# Patient Record
Sex: MALE | Race: WHITE | Employment: FULL TIME | URBAN - METROPOLITAN AREA
[De-identification: names, ages, dates, MRNs, and addresses within clinical notes are randomized per-mention and may not be internally consistent; named-entity substitution may affect disease eponyms.]

---

## 2018-09-16 ENCOUNTER — HOSPITAL ENCOUNTER (EMERGENCY)
Facility: HOSPITAL | Age: 38
Discharge: HOME | End: 2018-09-16
Attending: EMERGENCY MEDICINE
Payer: COMMERCIAL

## 2018-09-16 VITALS
WEIGHT: 225 LBS | HEIGHT: 71 IN | HEART RATE: 97 BPM | SYSTOLIC BLOOD PRESSURE: 128 MMHG | RESPIRATION RATE: 18 BRPM | BODY MASS INDEX: 31.5 KG/M2 | DIASTOLIC BLOOD PRESSURE: 80 MMHG | OXYGEN SATURATION: 98 % | TEMPERATURE: 99 F

## 2018-09-16 DIAGNOSIS — S33.5XXA LUMBAR SPRAIN, INITIAL ENCOUNTER: Primary | ICD-10-CM

## 2018-09-16 DIAGNOSIS — M62.838 SPASM OF MUSCLE: ICD-10-CM

## 2018-09-16 PROCEDURE — 99283 EMERGENCY DEPT VISIT LOW MDM: CPT

## 2018-09-16 PROCEDURE — 96372 THER/PROPH/DIAG INJ SC/IM: CPT

## 2018-09-16 RX ORDER — KETOROLAC TROMETHAMINE 30 MG/ML
60 INJECTION, SOLUTION INTRAMUSCULAR; INTRAVENOUS ONCE
Status: COMPLETED | OUTPATIENT
Start: 2018-09-16 | End: 2018-09-16

## 2018-09-16 RX ORDER — CYCLOBENZAPRINE HCL 10 MG
10 TABLET ORAL 3 TIMES DAILY PRN
Qty: 20 TABLET | Refills: 0 | Status: SHIPPED | OUTPATIENT
Start: 2018-09-16 | End: 2018-09-23

## 2018-09-16 RX ORDER — HYDROCODONE BITARTRATE AND ACETAMINOPHEN 5; 325 MG/1; MG/1
1-2 TABLET ORAL EVERY 4 HOURS PRN
Qty: 8 TABLET | Refills: 0 | Status: SHIPPED | OUTPATIENT
Start: 2018-09-16 | End: 2020-02-24 | Stop reason: ALTCHOICE

## 2018-09-17 NOTE — ED PROVIDER NOTES
Patient Seen in: [facility] Emergency Department    History   Patient presents with:  Back Pain (musculoskeletal)    Stated Complaint: back pain after suddenly stopping while playing soccer, no fall    HPI    Patient is a 71-year-old male who presen extremities  Extremities:/Cyanosis/edema    ED Course   Labs Reviewed - No data to display       Muscular skeletal back pain likely pulled muscle spasm. Discussed with patient, cannot rule out disc disease or other. Was given 60 of IM Toradol.   Suggested

## 2020-07-24 DIAGNOSIS — Z11.59 SPECIAL SCREENING EXAMINATION FOR UNSPECIFIED VIRAL DISEASE: Primary | ICD-10-CM

## 2020-08-04 DIAGNOSIS — Z11.59 SPECIAL SCREENING EXAMINATION FOR UNSPECIFIED VIRAL DISEASE: Primary | ICD-10-CM

## 2020-08-19 DIAGNOSIS — Z11.59 SPECIAL SCREENING EXAMINATION FOR UNSPECIFIED VIRAL DISEASE: Primary | ICD-10-CM

## 2020-08-24 ENCOUNTER — LAB SERVICES (OUTPATIENT)
Dept: LAB | Age: 40
End: 2020-08-24

## 2020-08-24 DIAGNOSIS — Z11.59 SPECIAL SCREENING EXAMINATION FOR UNSPECIFIED VIRAL DISEASE: ICD-10-CM

## 2020-08-24 PROCEDURE — 87635 SARS-COV-2 COVID-19 AMP PRB: CPT | Performed by: INTERNAL MEDICINE

## 2020-08-25 LAB
SARS-COV-2 RNA RESP QL NAA+PROBE: NOT DETECTED
SERVICE CMNT-IMP: NORMAL
SPECIMEN SOURCE: NORMAL

## 2021-12-22 ENCOUNTER — LAB SERVICES (OUTPATIENT)
Dept: LAB | Age: 41
End: 2021-12-22

## 2021-12-22 ENCOUNTER — LAB REQUISITION (OUTPATIENT)
Dept: LAB | Age: 41
End: 2021-12-22

## 2021-12-22 DIAGNOSIS — D64.9 ANEMIA, UNSPECIFIED: ICD-10-CM

## 2021-12-22 LAB
DEPRECATED RDW RBC: 40.5 FL (ref 39–50)
ERYTHROCYTE [DISTWIDTH] IN BLOOD: 13.9 % (ref 11–15)
FERRITIN SERPL-MCNC: 3 NG/ML (ref 26–388)
HCT VFR BLD CALC: 28.6 % (ref 39–51)
HGB BLD-MCNC: 8.5 G/DL (ref 13–17)
IRON SATN MFR SERPL: 4 % (ref 15–45)
IRON SERPL-MCNC: 18 MCG/DL (ref 65–175)
MCH RBC QN AUTO: 23.8 PG (ref 26–34)
MCHC RBC AUTO-ENTMCNC: 29.7 G/DL (ref 32–36.5)
MCV RBC AUTO: 80.1 FL (ref 78–100)
NRBC BLD MANUAL-RTO: 0 /100 WBC
PLATELET # BLD AUTO: 311 K/MCL (ref 140–450)
RBC # BLD: 3.57 MIL/MCL (ref 4.5–5.9)
TIBC SERPL-MCNC: 427 MCG/DL (ref 250–450)
WBC # BLD: 6.9 K/MCL (ref 4.2–11)

## 2021-12-22 PROCEDURE — 83540 ASSAY OF IRON: CPT | Performed by: CLINICAL MEDICAL LABORATORY

## 2021-12-22 PROCEDURE — 82728 ASSAY OF FERRITIN: CPT | Performed by: CLINICAL MEDICAL LABORATORY

## 2021-12-22 PROCEDURE — 83550 IRON BINDING TEST: CPT | Performed by: CLINICAL MEDICAL LABORATORY

## 2021-12-22 PROCEDURE — 85027 COMPLETE CBC AUTOMATED: CPT | Performed by: CLINICAL MEDICAL LABORATORY

## 2022-01-24 DIAGNOSIS — Z11.59 SCREENING EXAMINATION FOR OTHER ARTHROPOD-BORNE VIRAL DISEASES: Primary | ICD-10-CM

## 2022-01-28 ENCOUNTER — LAB SERVICES (OUTPATIENT)
Dept: LAB | Age: 42
End: 2022-01-28

## 2022-01-28 PROCEDURE — U0003 INFECTIOUS AGENT DETECTION BY NUCLEIC ACID (DNA OR RNA); SEVERE ACUTE RESPIRATORY SYNDROME CORONAVIRUS 2 (SARS-COV-2) (CORONAVIRUS DISEASE [COVID-19]), AMPLIFIED PROBE TECHNIQUE, MAKING USE OF HIGH THROUGHPUT TECHNOLOGIES AS DESCRIBED BY CMS-2020-01-R: HCPCS | Performed by: PSYCHIATRY & NEUROLOGY

## 2022-01-28 PROCEDURE — U0005 INFEC AGEN DETEC AMPLI PROBE: HCPCS | Performed by: PSYCHIATRY & NEUROLOGY

## 2022-01-29 LAB
SARS-COV-2 RNA RESP QL NAA+PROBE: NOT DETECTED
SERVICE CMNT-IMP: NORMAL
SERVICE CMNT-IMP: NORMAL

## 2022-10-05 ENCOUNTER — TELEPHONE (OUTPATIENT)
Dept: SURGERY | Facility: CLINIC | Age: 42
End: 2022-10-05

## 2022-10-05 NOTE — TELEPHONE ENCOUNTER
Pt's wife calling stating pt had MRI 9/20/23 at [address] [facility]. She states Dr. Toni Hernandez suggest pt had a stroke. Scheduled pt for first available 2/1/23. Asked Pts wife to bring in imaging. Please advise if pt should be scheduled sooner than first available.

## 2022-10-11 ENCOUNTER — TELEPHONE (OUTPATIENT)
Dept: SURGERY | Facility: CLINIC | Age: 42
End: 2022-10-11

## 2022-10-11 NOTE — TELEPHONE ENCOUNTER
Pt brought scrotal US from [address] [place] [address] in today . DOS 9/16/22.    - multiple clustered extratesticular cystic structures in left hemiscrotum near left epididymis measuring up to 2.7 x 1.7 cm.  - right testicular microlithiasis  - trace b/l hydroceles    Will discuss at upcoming visit. In patient folder.

## 2022-10-11 NOTE — TELEPHONE ENCOUNTER
PT brought in disc and imaging results; imaging uploaded to edw pacs and disc put in  drawer; imaging results endorse to provider for review

## 2022-10-24 ENCOUNTER — OFFICE VISIT (OUTPATIENT)
Dept: NEUROLOGY | Facility: CLINIC | Age: 42
End: 2022-10-24
Payer: COMMERCIAL

## 2022-10-24 VITALS
DIASTOLIC BLOOD PRESSURE: 72 MMHG | SYSTOLIC BLOOD PRESSURE: 126 MMHG | HEART RATE: 74 BPM | BODY MASS INDEX: 29 KG/M2 | WEIGHT: 207 LBS | RESPIRATION RATE: 16 BRPM

## 2022-10-24 DIAGNOSIS — M89.9 FRONTAL SKULL LESION: ICD-10-CM

## 2022-10-24 DIAGNOSIS — Z86.73 OLD CEREBELLAR INFARCT WITHOUT LATE EFFECT: ICD-10-CM

## 2022-10-24 PROCEDURE — 99244 OFF/OP CNSLTJ NEW/EST MOD 40: CPT | Performed by: OTHER

## 2022-10-24 PROCEDURE — 3078F DIAST BP <80 MM HG: CPT | Performed by: OTHER

## 2022-10-24 PROCEDURE — 3074F SYST BP LT 130 MM HG: CPT | Performed by: OTHER

## 2022-10-24 NOTE — PROGRESS NOTES
Patient states left sided headaches. Patient states tingling sensation. Denies vision changes. Denies changes in speech and memory. Patient states some changes in balance.

## 2022-11-03 ENCOUNTER — HOSPITAL ENCOUNTER (OUTPATIENT)
Dept: CV DIAGNOSTICS | Facility: HOSPITAL | Age: 42
Discharge: HOME | End: 2022-11-03
Attending: Other
Payer: COMMERCIAL

## 2022-11-03 ENCOUNTER — TELEPHONE (OUTPATIENT)
Dept: SURGERY | Facility: CLINIC | Age: 42
End: 2022-11-03

## 2022-11-03 ENCOUNTER — HOSPITAL ENCOUNTER (OUTPATIENT)
Dept: CT IMAGING | Facility: HOSPITAL | Age: 42
Discharge: HOME | End: 2022-11-03
Attending: Other
Payer: COMMERCIAL

## 2022-11-03 ENCOUNTER — LAB ENCOUNTER (OUTPATIENT)
Dept: LAB | Age: 42
End: 2022-11-03
Attending: Other
Payer: COMMERCIAL

## 2022-11-03 DIAGNOSIS — M89.9 FRONTAL SKULL LESION: ICD-10-CM

## 2022-11-03 DIAGNOSIS — Z86.73 OLD CEREBELLAR INFARCT WITHOUT LATE EFFECT: ICD-10-CM

## 2022-11-03 LAB
CHOLEST SERPL-MCNC: 140 MG/DL (ref ?–200)
EST. AVERAGE GLUCOSE BLD GHB EST-MCNC: 123 MG/DL (ref 68–126)
FASTING PATIENT LIPID ANSWER: YES
HBA1C MFR BLD: 5.9 % (ref ?–5.7)
HDLC SERPL-MCNC: 55 MG/DL (ref 40–59)
LDLC SERPL CALC-MCNC: 72 MG/DL (ref ?–100)
NONHDLC SERPL-MCNC: 85 MG/DL (ref ?–130)
TRIGL SERPL-MCNC: 66 MG/DL (ref 30–149)
VLDLC SERPL CALC-MCNC: 10 MG/DL (ref 0–30)

## 2022-11-03 PROCEDURE — 80061 LIPID PANEL: CPT

## 2022-11-03 PROCEDURE — 93306 TTE W/DOPPLER COMPLETE: CPT | Performed by: OTHER

## 2022-11-03 PROCEDURE — 70450 CT HEAD/BRAIN W/O DYE: CPT | Performed by: OTHER

## 2022-11-03 PROCEDURE — 83036 HEMOGLOBIN GLYCOSYLATED A1C: CPT

## 2022-11-03 PROCEDURE — 36415 COLL VENOUS BLD VENIPUNCTURE: CPT

## 2022-11-03 NOTE — TELEPHONE ENCOUNTER
Pt came in to drop of imaging from 2016; turns out imaging was done at [facility]; imaging is in chart; I made sure to double check the imaging description in disc was the same as the description in chart; pt would like Dr to review imaging

## 2022-11-07 NOTE — TELEPHONE ENCOUNTER
Patient's partner Kathie Herron notified (ok per HIPAA consent) that Dr Naomie Carrero will not be in clinic until tomorrow and she will be notified of Dr Juana pritchett after she reviews both scans.

## 2022-11-18 ENCOUNTER — HOSPITAL ENCOUNTER (OUTPATIENT)
Dept: CT IMAGING | Facility: HOSPITAL | Age: 42
Discharge: HOME | End: 2022-11-18
Attending: Other
Payer: COMMERCIAL

## 2022-11-18 DIAGNOSIS — C80.1: ICD-10-CM

## 2022-11-18 DIAGNOSIS — C79.51: ICD-10-CM

## 2022-11-18 LAB
CREAT BLD-MCNC: 1.1 MG/DL
GFR SERPLBLD BASED ON 1.73 SQ M-ARVRAT: 85 ML/MIN/1.73M2 (ref 60–?)

## 2022-11-18 PROCEDURE — 71260 CT THORAX DX C+: CPT | Performed by: OTHER

## 2022-11-18 PROCEDURE — 82565 ASSAY OF CREATININE: CPT

## 2022-11-18 PROCEDURE — 74177 CT ABD & PELVIS W/CONTRAST: CPT | Performed by: OTHER

## 2022-11-18 RX ORDER — IOHEXOL 350 MG/ML
100 INJECTION, SOLUTION INTRAVENOUS
Status: COMPLETED | OUTPATIENT
Start: 2022-11-18 | End: 2022-11-18

## 2022-11-18 RX ADMIN — IOHEXOL 100 ML: 350 INJECTION, SOLUTION INTRAVENOUS at 19:04:00

## 2022-11-30 ENCOUNTER — OFFICE VISIT (OUTPATIENT)
Dept: SURGERY | Facility: CLINIC | Age: 42
End: 2022-11-30
Payer: COMMERCIAL

## 2022-11-30 DIAGNOSIS — N50.89 SCROTAL MASS: Primary | ICD-10-CM

## 2022-11-30 DIAGNOSIS — N50.3 EPIDIDYMAL CYST: ICD-10-CM

## 2022-11-30 DIAGNOSIS — M54.42 CHRONIC LEFT-SIDED LOW BACK PAIN WITH LEFT-SIDED SCIATICA: ICD-10-CM

## 2022-11-30 DIAGNOSIS — R82.90 URINE FINDING: ICD-10-CM

## 2022-11-30 DIAGNOSIS — R10.32 LEFT INGUINAL PAIN: ICD-10-CM

## 2022-11-30 DIAGNOSIS — N52.9 ERECTILE DYSFUNCTION, UNSPECIFIED ERECTILE DYSFUNCTION TYPE: ICD-10-CM

## 2022-11-30 DIAGNOSIS — G89.29 CHRONIC LEFT-SIDED LOW BACK PAIN WITH LEFT-SIDED SCIATICA: ICD-10-CM

## 2022-11-30 LAB
APPEARANCE: CLEAR
BILIRUBIN: NEGATIVE
GLUCOSE (URINE DIPSTICK): NEGATIVE MG/DL
LEUKOCYTES: NEGATIVE
MULTISTIX LOT#: NORMAL NUMERIC
NITRITE, URINE: NEGATIVE
OCCULT BLOOD: NEGATIVE
PH, URINE: 5.5 (ref 4.5–8)
PROTEIN (URINE DIPSTICK): NEGATIVE MG/DL
SPECIFIC GRAVITY: >=1.03 (ref 1–1.03)
URINE-COLOR: YELLOW
UROBILINOGEN,SEMI-QN: 0.2 MG/DL (ref 0–1.9)

## 2022-11-30 PROCEDURE — 99244 OFF/OP CNSLTJ NEW/EST MOD 40: CPT | Performed by: UROLOGY

## 2022-11-30 PROCEDURE — 81003 URINALYSIS AUTO W/O SCOPE: CPT | Performed by: UROLOGY

## 2022-12-24 ENCOUNTER — HOSPITAL ENCOUNTER (EMERGENCY)
Facility: HOSPITAL | Age: 42
Discharge: HOME | End: 2022-12-24
Attending: EMERGENCY MEDICINE
Payer: COMMERCIAL

## 2022-12-24 ENCOUNTER — APPOINTMENT (OUTPATIENT)
Dept: GENERAL RADIOLOGY | Facility: HOSPITAL | Age: 42
End: 2022-12-24
Attending: EMERGENCY MEDICINE
Payer: COMMERCIAL

## 2022-12-24 ENCOUNTER — APPOINTMENT (OUTPATIENT)
Dept: CT IMAGING | Facility: HOSPITAL | Age: 42
End: 2022-12-24
Attending: EMERGENCY MEDICINE
Payer: COMMERCIAL

## 2022-12-24 VITALS
HEIGHT: 70 IN | DIASTOLIC BLOOD PRESSURE: 76 MMHG | TEMPERATURE: 99 F | BODY MASS INDEX: 30.06 KG/M2 | RESPIRATION RATE: 17 BRPM | OXYGEN SATURATION: 98 % | SYSTOLIC BLOOD PRESSURE: 124 MMHG | HEART RATE: 63 BPM | WEIGHT: 210 LBS

## 2022-12-24 DIAGNOSIS — D64.9 SYMPTOMATIC ANEMIA: Primary | ICD-10-CM

## 2022-12-24 DIAGNOSIS — K64.9 BLEEDING HEMORRHOID: ICD-10-CM

## 2022-12-24 DIAGNOSIS — R53.1 WEAKNESS GENERALIZED: ICD-10-CM

## 2022-12-24 LAB
ALBUMIN SERPL-MCNC: 3.5 G/DL (ref 3.4–5)
ALBUMIN/GLOB SERPL: 1.1 {RATIO} (ref 1–2)
ALP LIVER SERPL-CCNC: 61 U/L
ALT SERPL-CCNC: 18 U/L
ANION GAP SERPL CALC-SCNC: 5 MMOL/L (ref 0–18)
ANTIBODY SCREEN: NEGATIVE
APTT PPP: 23.9 SECONDS (ref 23.3–35.6)
AST SERPL-CCNC: 17 U/L (ref 15–37)
ATRIAL RATE: 62 BPM
BASOPHILS # BLD AUTO: 0.08 X10(3) UL (ref 0–0.2)
BASOPHILS NFR BLD AUTO: 1.8 %
BILIRUB SERPL-MCNC: 0.3 MG/DL (ref 0.1–2)
BUN BLD-MCNC: 13 MG/DL (ref 7–18)
CALCIUM BLD-MCNC: 8.5 MG/DL (ref 8.5–10.1)
CHLORIDE SERPL-SCNC: 109 MMOL/L (ref 98–112)
CO2 SERPL-SCNC: 25 MMOL/L (ref 21–32)
CREAT BLD-MCNC: 1.11 MG/DL
DEPRECATED HBV CORE AB SER IA-ACNC: 1.9 NG/ML
EOSINOPHIL # BLD AUTO: 0.23 X10(3) UL (ref 0–0.7)
EOSINOPHIL NFR BLD AUTO: 5.1 %
ERYTHROCYTE [DISTWIDTH] IN BLOOD BY AUTOMATED COUNT: 14.9 %
FLUAV + FLUBV RNA SPEC NAA+PROBE: NEGATIVE
FLUAV + FLUBV RNA SPEC NAA+PROBE: NEGATIVE
GFR SERPLBLD BASED ON 1.73 SQ M-ARVRAT: 84 ML/MIN/1.73M2 (ref 60–?)
GLOBULIN PLAS-MCNC: 3.2 G/DL (ref 2.8–4.4)
GLUCOSE BLD-MCNC: 156 MG/DL (ref 70–99)
HCT VFR BLD AUTO: 27 %
HGB BLD-MCNC: 7.4 G/DL
IMM GRANULOCYTES # BLD AUTO: 0.02 X10(3) UL (ref 0–1)
IMM GRANULOCYTES NFR BLD: 0.4 %
INR BLD: 1.05 (ref 0.85–1.16)
IRON SATN MFR SERPL: 2 %
IRON SERPL-MCNC: 12 UG/DL
LYMPHOCYTES # BLD AUTO: 1.61 X10(3) UL (ref 1–4)
LYMPHOCYTES NFR BLD AUTO: 35.6 %
MCH RBC QN AUTO: 19.7 PG (ref 26–34)
MCHC RBC AUTO-ENTMCNC: 27.4 G/DL (ref 31–37)
MCV RBC AUTO: 71.8 FL
MONOCYTES # BLD AUTO: 0.41 X10(3) UL (ref 0.1–1)
MONOCYTES NFR BLD AUTO: 9.1 %
NEUTROPHILS # BLD AUTO: 2.17 X10 (3) UL (ref 1.5–7.7)
NEUTROPHILS # BLD AUTO: 2.17 X10(3) UL (ref 1.5–7.7)
NEUTROPHILS NFR BLD AUTO: 48 %
OSMOLALITY SERPL CALC.SUM OF ELEC: 291 MOSM/KG (ref 275–295)
P AXIS: 7 DEGREES
P-R INTERVAL: 158 MS
PLATELET # BLD AUTO: 326 10(3)UL (ref 150–450)
POTASSIUM SERPL-SCNC: 3.8 MMOL/L (ref 3.5–5.1)
PROT SERPL-MCNC: 6.7 G/DL (ref 6.4–8.2)
PROTHROMBIN TIME: 13.7 SECONDS (ref 11.6–14.8)
Q-T INTERVAL: 392 MS
QRS DURATION: 82 MS
QTC CALCULATION (BEZET): 397 MS
R AXIS: 11 DEGREES
RBC # BLD AUTO: 3.76 X10(6)UL
RH BLOOD TYPE: POSITIVE
RSV RNA SPEC NAA+PROBE: NEGATIVE
SARS-COV-2 RNA RESP QL NAA+PROBE: NOT DETECTED
SODIUM SERPL-SCNC: 139 MMOL/L (ref 136–145)
T AXIS: -2 DEGREES
TIBC SERPL-MCNC: 511 UG/DL (ref 240–450)
TRANSFERRIN SERPL-MCNC: 343 MG/DL (ref 200–360)
TROPONIN I HIGH SENSITIVITY: 4 NG/L
TSI SER-ACNC: 2.13 MIU/ML (ref 0.36–3.74)
VENTRICULAR RATE: 62 BPM
WBC # BLD AUTO: 4.5 X10(3) UL (ref 4–11)

## 2022-12-24 PROCEDURE — 82728 ASSAY OF FERRITIN: CPT | Performed by: EMERGENCY MEDICINE

## 2022-12-24 PROCEDURE — 93005 ELECTROCARDIOGRAM TRACING: CPT

## 2022-12-24 PROCEDURE — 71045 X-RAY EXAM CHEST 1 VIEW: CPT | Performed by: EMERGENCY MEDICINE

## 2022-12-24 PROCEDURE — 96361 HYDRATE IV INFUSION ADD-ON: CPT

## 2022-12-24 PROCEDURE — 83550 IRON BINDING TEST: CPT | Performed by: EMERGENCY MEDICINE

## 2022-12-24 PROCEDURE — 70450 CT HEAD/BRAIN W/O DYE: CPT | Performed by: EMERGENCY MEDICINE

## 2022-12-24 PROCEDURE — 86900 BLOOD TYPING SEROLOGIC ABO: CPT | Performed by: EMERGENCY MEDICINE

## 2022-12-24 PROCEDURE — 84443 ASSAY THYROID STIM HORMONE: CPT | Performed by: EMERGENCY MEDICINE

## 2022-12-24 PROCEDURE — 83540 ASSAY OF IRON: CPT | Performed by: EMERGENCY MEDICINE

## 2022-12-24 PROCEDURE — 85025 COMPLETE CBC W/AUTO DIFF WBC: CPT | Performed by: EMERGENCY MEDICINE

## 2022-12-24 PROCEDURE — 93010 ELECTROCARDIOGRAM REPORT: CPT

## 2022-12-24 PROCEDURE — 84484 ASSAY OF TROPONIN QUANT: CPT | Performed by: EMERGENCY MEDICINE

## 2022-12-24 PROCEDURE — 96365 THER/PROPH/DIAG IV INF INIT: CPT

## 2022-12-24 PROCEDURE — 0241U SARS-COV-2/FLU A AND B/RSV BY PCR (GENEXPERT): CPT | Performed by: EMERGENCY MEDICINE

## 2022-12-24 PROCEDURE — 80053 COMPREHEN METABOLIC PANEL: CPT | Performed by: EMERGENCY MEDICINE

## 2022-12-24 PROCEDURE — 85730 THROMBOPLASTIN TIME PARTIAL: CPT | Performed by: EMERGENCY MEDICINE

## 2022-12-24 PROCEDURE — 86850 RBC ANTIBODY SCREEN: CPT | Performed by: EMERGENCY MEDICINE

## 2022-12-24 PROCEDURE — 36430 TRANSFUSION BLD/BLD COMPNT: CPT

## 2022-12-24 PROCEDURE — 99285 EMERGENCY DEPT VISIT HI MDM: CPT

## 2022-12-24 PROCEDURE — 86920 COMPATIBILITY TEST SPIN: CPT

## 2022-12-24 PROCEDURE — 86901 BLOOD TYPING SEROLOGIC RH(D): CPT | Performed by: EMERGENCY MEDICINE

## 2022-12-24 PROCEDURE — 85610 PROTHROMBIN TIME: CPT | Performed by: EMERGENCY MEDICINE

## 2022-12-24 RX ORDER — SODIUM CHLORIDE 9 MG/ML
INJECTION, SOLUTION INTRAVENOUS ONCE
Status: COMPLETED | OUTPATIENT
Start: 2022-12-24 | End: 2022-12-24

## 2022-12-24 NOTE — ED INITIAL ASSESSMENT (HPI)
Patient was walking in store and states he felt faint. \"It felt like my body was shutting down. \"    No fall. No LOC.

## 2022-12-24 NOTE — DISCHARGE INSTRUCTIONS
If you start bleeding from hemorrhoids again, come to the emergency department. Make sure to follow-up with regular primary care so that you can keep a close eye on the hemoglobin. Make sure to follow-up with gastroenterology on a regular basis. Follow-up with hematology for further iron infusion  Even with the unit of blood given today, you will still be significantly anemic. Please take it easy, do not stress or try to do exercise. No driving or dangerous activity until you follow-up.   Take over-the-counter iron tablets  Return for any problems

## 2022-12-26 LAB — BLOOD TYPE BARCODE: 5100

## 2023-01-03 ENCOUNTER — APPOINTMENT (OUTPATIENT)
Dept: HEMATOLOGY/ONCOLOGY | Facility: HOSPITAL | Age: 43
End: 2023-01-03
Attending: INTERNAL MEDICINE
Payer: COMMERCIAL

## 2023-01-06 ENCOUNTER — TELEPHONE (OUTPATIENT)
Dept: NEUROLOGY | Facility: CLINIC | Age: 43
End: 2023-01-06

## 2023-01-17 ENCOUNTER — HOSPITAL ENCOUNTER (EMERGENCY)
Facility: HOSPITAL | Age: 43
Discharge: HOME | End: 2023-01-17
Attending: EMERGENCY MEDICINE
Payer: COMMERCIAL

## 2023-01-17 VITALS
TEMPERATURE: 97 F | RESPIRATION RATE: 19 BRPM | DIASTOLIC BLOOD PRESSURE: 81 MMHG | HEART RATE: 74 BPM | SYSTOLIC BLOOD PRESSURE: 120 MMHG | OXYGEN SATURATION: 97 %

## 2023-01-17 DIAGNOSIS — K62.5 RECTAL BLEEDING: Primary | ICD-10-CM

## 2023-01-17 DIAGNOSIS — D50.9 IRON DEFICIENCY ANEMIA, UNSPECIFIED IRON DEFICIENCY ANEMIA TYPE: ICD-10-CM

## 2023-01-17 LAB
ALBUMIN SERPL-MCNC: 3.5 G/DL (ref 3.4–5)
ALBUMIN/GLOB SERPL: 1.2 {RATIO} (ref 1–2)
ALP LIVER SERPL-CCNC: 76 U/L
ALT SERPL-CCNC: 26 U/L
ANION GAP SERPL CALC-SCNC: 3 MMOL/L (ref 0–18)
ANTIBODY SCREEN: NEGATIVE
AST SERPL-CCNC: 22 U/L (ref 15–37)
BASOPHILS # BLD AUTO: 0.09 X10(3) UL (ref 0–0.2)
BASOPHILS NFR BLD AUTO: 1.2 %
BILIRUB SERPL-MCNC: 0.2 MG/DL (ref 0.1–2)
BUN BLD-MCNC: 16 MG/DL (ref 7–18)
CALCIUM BLD-MCNC: 8.6 MG/DL (ref 8.5–10.1)
CHLORIDE SERPL-SCNC: 108 MMOL/L (ref 98–112)
CO2 SERPL-SCNC: 28 MMOL/L (ref 21–32)
CREAT BLD-MCNC: 1.08 MG/DL
EOSINOPHIL # BLD AUTO: 0.21 X10(3) UL (ref 0–0.7)
EOSINOPHIL NFR BLD AUTO: 2.8 %
ERYTHROCYTE [DISTWIDTH] IN BLOOD BY AUTOMATED COUNT: 23.9 %
GFR SERPLBLD BASED ON 1.73 SQ M-ARVRAT: 87 ML/MIN/1.73M2 (ref 60–?)
GLOBULIN PLAS-MCNC: 2.9 G/DL (ref 2.8–4.4)
GLUCOSE BLD-MCNC: 100 MG/DL (ref 70–99)
HCT VFR BLD AUTO: 31.3 %
HGB BLD-MCNC: 9.3 G/DL
IMM GRANULOCYTES # BLD AUTO: 0.02 X10(3) UL (ref 0–1)
IMM GRANULOCYTES NFR BLD: 0.3 %
LYMPHOCYTES # BLD AUTO: 2.54 X10(3) UL (ref 1–4)
LYMPHOCYTES NFR BLD AUTO: 33.4 %
MCH RBC QN AUTO: 23.2 PG (ref 26–34)
MCHC RBC AUTO-ENTMCNC: 29.7 G/DL (ref 31–37)
MCV RBC AUTO: 78.1 FL
MONOCYTES # BLD AUTO: 0.68 X10(3) UL (ref 0.1–1)
MONOCYTES NFR BLD AUTO: 8.9 %
NEUTROPHILS # BLD AUTO: 4.07 X10 (3) UL (ref 1.5–7.7)
NEUTROPHILS # BLD AUTO: 4.07 X10(3) UL (ref 1.5–7.7)
NEUTROPHILS NFR BLD AUTO: 53.4 %
OSMOLALITY SERPL CALC.SUM OF ELEC: 289 MOSM/KG (ref 275–295)
PLATELET # BLD AUTO: 336 10(3)UL (ref 150–450)
PLATELET MORPHOLOGY: NORMAL
POTASSIUM SERPL-SCNC: 3.6 MMOL/L (ref 3.5–5.1)
PROT SERPL-MCNC: 6.4 G/DL (ref 6.4–8.2)
RBC # BLD AUTO: 4.01 X10(6)UL
RH BLOOD TYPE: POSITIVE
SARS-COV-2 RNA RESP QL NAA+PROBE: NOT DETECTED
SODIUM SERPL-SCNC: 139 MMOL/L (ref 136–145)
TROPONIN I HIGH SENSITIVITY: 4 NG/L
WBC # BLD AUTO: 7.6 X10(3) UL (ref 4–11)

## 2023-01-17 PROCEDURE — 80053 COMPREHEN METABOLIC PANEL: CPT | Performed by: EMERGENCY MEDICINE

## 2023-01-17 PROCEDURE — 84484 ASSAY OF TROPONIN QUANT: CPT | Performed by: EMERGENCY MEDICINE

## 2023-01-17 PROCEDURE — 99284 EMERGENCY DEPT VISIT MOD MDM: CPT

## 2023-01-17 PROCEDURE — 85025 COMPLETE CBC W/AUTO DIFF WBC: CPT | Performed by: EMERGENCY MEDICINE

## 2023-01-17 PROCEDURE — 86901 BLOOD TYPING SEROLOGIC RH(D): CPT | Performed by: EMERGENCY MEDICINE

## 2023-01-17 PROCEDURE — 86850 RBC ANTIBODY SCREEN: CPT | Performed by: EMERGENCY MEDICINE

## 2023-01-17 PROCEDURE — 96360 HYDRATION IV INFUSION INIT: CPT

## 2023-01-17 PROCEDURE — 86900 BLOOD TYPING SEROLOGIC ABO: CPT | Performed by: EMERGENCY MEDICINE

## 2023-01-17 RX ORDER — SODIUM CHLORIDE 9 MG/ML
1000 INJECTION, SOLUTION INTRAVENOUS ONCE
Status: COMPLETED | OUTPATIENT
Start: 2023-01-17 | End: 2023-01-17

## 2023-01-17 NOTE — ED INITIAL ASSESSMENT (HPI)
A&Ox3 ambulatory patient p/w blood in stool    Patient reports he was hospitalized 12/24/22 for GIB and recd blood transfusion    Reports having bright red blood in stool and in toilet bowl every BM for the past week    Also endorses +nausea, +LH, weakness, fatigue, intermittent lower abdominal pain    RR even/NL

## 2023-01-18 NOTE — DISCHARGE INSTRUCTIONS
Continue your iron supplements. Return to the emergency department for any new or worsening symptoms. You will receive a phone call from [address] [place] [address] tomorrow to move your appointment up.

## 2023-01-23 ENCOUNTER — LAB ENCOUNTER (OUTPATIENT)
Dept: LAB | Facility: HOSPITAL | Age: 43
End: 2023-01-23
Attending: INTERNAL MEDICINE
Payer: COMMERCIAL

## 2023-01-23 DIAGNOSIS — Z20.822 ENCOUNTER FOR PREPROCEDURE SCREENING LABORATORY TESTING FOR COVID-19: ICD-10-CM

## 2023-01-23 DIAGNOSIS — Z01.812 ENCOUNTER FOR PREPROCEDURE SCREENING LABORATORY TESTING FOR COVID-19: ICD-10-CM

## 2023-01-24 ENCOUNTER — OFFICE VISIT (OUTPATIENT)
Dept: HEMATOLOGY/ONCOLOGY | Facility: HOSPITAL | Age: 43
End: 2023-01-24
Attending: INTERNAL MEDICINE
Payer: COMMERCIAL

## 2023-01-24 VITALS
SYSTOLIC BLOOD PRESSURE: 115 MMHG | HEART RATE: 71 BPM | WEIGHT: 218.38 LBS | RESPIRATION RATE: 18 BRPM | TEMPERATURE: 98 F | DIASTOLIC BLOOD PRESSURE: 67 MMHG | BODY MASS INDEX: 31 KG/M2 | OXYGEN SATURATION: 100 %

## 2023-01-24 DIAGNOSIS — D50.0 IRON DEFICIENCY ANEMIA DUE TO CHRONIC BLOOD LOSS: Primary | ICD-10-CM

## 2023-01-24 LAB — SARS-COV-2 RNA RESP QL NAA+PROBE: NOT DETECTED

## 2023-01-24 PROCEDURE — 3074F SYST BP LT 130 MM HG: CPT | Performed by: INTERNAL MEDICINE

## 2023-01-24 PROCEDURE — 3078F DIAST BP <80 MM HG: CPT | Performed by: INTERNAL MEDICINE

## 2023-01-24 PROCEDURE — 99245 OFF/OP CONSLTJ NEW/EST HI 55: CPT | Performed by: INTERNAL MEDICINE

## 2023-01-24 NOTE — PROGRESS NOTES
Education Record    Learner:  Patient and Family Member    Disease / Diagnosis: Anemia    Barriers / Limitations:  None   Comments:    Method:  Discussion   Comments:    General Topics:  Plan of care reviewed   Comments:    Outcome:  Shows understanding   Comments:    Patient states his energy is improving. NO more rectal bleeding. A small amount of shortness of breath. Will get labs in 8 weeks and 1 dose of IV iron.

## 2023-01-25 ENCOUNTER — ANESTHESIA (OUTPATIENT)
Dept: ENDOSCOPY | Facility: HOSPITAL | Age: 43
End: 2023-01-25
Payer: COMMERCIAL

## 2023-01-25 ENCOUNTER — ANESTHESIA EVENT (OUTPATIENT)
Dept: ENDOSCOPY | Facility: HOSPITAL | Age: 43
End: 2023-01-25
Payer: COMMERCIAL

## 2023-01-25 ENCOUNTER — HOSPITAL ENCOUNTER (OUTPATIENT)
Facility: HOSPITAL | Age: 43
Setting detail: HOSPITAL OUTPATIENT SURGERY
Discharge: HOME | End: 2023-01-25
Attending: INTERNAL MEDICINE | Admitting: INTERNAL MEDICINE
Payer: COMMERCIAL

## 2023-01-25 VITALS
WEIGHT: 217 LBS | HEART RATE: 44 BPM | RESPIRATION RATE: 16 BRPM | BODY MASS INDEX: 31.07 KG/M2 | HEIGHT: 70 IN | SYSTOLIC BLOOD PRESSURE: 131 MMHG | OXYGEN SATURATION: 100 % | TEMPERATURE: 98 F | DIASTOLIC BLOOD PRESSURE: 95 MMHG

## 2023-01-25 DIAGNOSIS — Z20.822 ENCOUNTER FOR PREOPERATIVE SCREENING LABORATORY TESTING FOR COVID-19 VIRUS: Primary | ICD-10-CM

## 2023-01-25 DIAGNOSIS — K92.1 HEMATOCHEZIA: ICD-10-CM

## 2023-01-25 DIAGNOSIS — Z01.812 ENCOUNTER FOR PREPROCEDURE SCREENING LABORATORY TESTING FOR COVID-19: ICD-10-CM

## 2023-01-25 DIAGNOSIS — Z01.812 ENCOUNTER FOR PREOPERATIVE SCREENING LABORATORY TESTING FOR COVID-19 VIRUS: Primary | ICD-10-CM

## 2023-01-25 DIAGNOSIS — D50.8 OTHER IRON DEFICIENCY ANEMIAS: ICD-10-CM

## 2023-01-25 DIAGNOSIS — Z20.822 ENCOUNTER FOR PREPROCEDURE SCREENING LABORATORY TESTING FOR COVID-19: ICD-10-CM

## 2023-01-25 PROCEDURE — 0DB98ZX EXCISION OF DUODENUM, VIA NATURAL OR ARTIFICIAL OPENING ENDOSCOPIC, DIAGNOSTIC: ICD-10-PCS | Performed by: INTERNAL MEDICINE

## 2023-01-25 PROCEDURE — 88305 TISSUE EXAM BY PATHOLOGIST: CPT | Performed by: INTERNAL MEDICINE

## 2023-01-25 PROCEDURE — 0DB68ZX EXCISION OF STOMACH, VIA NATURAL OR ARTIFICIAL OPENING ENDOSCOPIC, DIAGNOSTIC: ICD-10-PCS | Performed by: INTERNAL MEDICINE

## 2023-01-25 PROCEDURE — 06LY8CC OCCLUSION OF HEMORRHOIDAL PLEXUS WITH EXTRALUMINAL DEVICE, VIA NATURAL OR ARTIFICIAL OPENING ENDOSCOPIC: ICD-10-PCS | Performed by: INTERNAL MEDICINE

## 2023-01-25 RX ORDER — ONDANSETRON 2 MG/ML
4 INJECTION INTRAMUSCULAR; INTRAVENOUS AS NEEDED
Status: DISCONTINUED | OUTPATIENT
Start: 2023-01-25 | End: 2023-01-25

## 2023-01-25 RX ORDER — NALOXONE HYDROCHLORIDE 0.4 MG/ML
80 INJECTION, SOLUTION INTRAMUSCULAR; INTRAVENOUS; SUBCUTANEOUS AS NEEDED
Status: DISCONTINUED | OUTPATIENT
Start: 2023-01-25 | End: 2023-01-25

## 2023-01-25 RX ORDER — LIDOCAINE HYDROCHLORIDE 10 MG/ML
INJECTION, SOLUTION EPIDURAL; INFILTRATION; INTRACAUDAL; PERINEURAL AS NEEDED
Status: DISCONTINUED | OUTPATIENT
Start: 2023-01-25 | End: 2023-01-25 | Stop reason: SURG

## 2023-01-25 RX ORDER — ONDANSETRON 2 MG/ML
INJECTION INTRAMUSCULAR; INTRAVENOUS
Status: COMPLETED
Start: 2023-01-25 | End: 2023-01-25

## 2023-01-25 RX ORDER — SODIUM CHLORIDE, SODIUM LACTATE, POTASSIUM CHLORIDE, CALCIUM CHLORIDE 600; 310; 30; 20 MG/100ML; MG/100ML; MG/100ML; MG/100ML
INJECTION, SOLUTION INTRAVENOUS CONTINUOUS
Status: DISCONTINUED | OUTPATIENT
Start: 2023-01-25 | End: 2023-01-25

## 2023-01-25 RX ADMIN — LIDOCAINE HYDROCHLORIDE 50 MG: 10 INJECTION, SOLUTION EPIDURAL; INFILTRATION; INTRACAUDAL; PERINEURAL at 09:25:00

## 2023-01-25 RX ADMIN — SODIUM CHLORIDE, SODIUM LACTATE, POTASSIUM CHLORIDE, CALCIUM CHLORIDE: 600; 310; 30; 20 INJECTION, SOLUTION INTRAVENOUS at 09:51:00

## 2023-01-25 NOTE — INTERVAL H&P NOTE
Pre-op Diagnosis: Other iron deficiency anemias [D50.8]  Hematochezia [K92.1]    The above referenced H&P was reviewed by Che Roman DO on 1/25/2023, the patient was examined and no significant changes have occurred in the patient's condition since the H&P was performed. I discussed with the patient and/or legal representative the potential benefits, risks and side effects of this procedure; the likelihood of the patient achieving goals; and potential problems that might occur during recuperation. I discussed reasonable alternatives to the procedure, including risks, benefits and side effects related to the alternatives and risks related to not receiving this procedure. We will proceed with procedure as planned.

## 2023-01-25 NOTE — ANESTHESIA POSTPROCEDURE EVALUATION
[address] [facility] Patient Status:  Hospital Outpatient Surgery   Age/Gender 36year old male MRN [id number]   Location [address] [address] Attending 3 Azeem Castanon DO   Hosp Day # 0 PCP None Pcp       Anesthesia Post-op Note    COLONOSCOPY with Hemorrhoid BANDING X3, ESOPHAGOGASTRODUODENOSCOPY (EGD) WITH BIOPSIES    Procedure Summary     Date: 01/25/23 Room / Location: [facility] ENDOSCOPY 02 / [facility] ENDOSCOPY    Anesthesia Start: 4290 Anesthesia Stop: 0951    Procedures:       COLONOSCOPY with Hemorrhoid BANDING X3, ESOPHAGOGASTRODUODENOSCOPY (EGD) WITH BIOPSIES      . Diagnosis:       Other iron deficiency anemias      Hematochezia      (EGD: HIATAL HERNIA, ESOPHAGITIS, GATRITIS COLON: HEMORRHOIDS,)    Surgeons: Orlando Mcintosh DO Anesthesiologist: George Truong MD    Anesthesia Type: MAC ASA Status: 3          Anesthesia Type: MAC    Vitals Value Taken Time   /90 01/25/23 0952   Temp  01/25/23 0952   Pulse 69 01/25/23 0952   Resp 16 01/25/23 0952   SpO2 100 % 01/25/23 0952       Patient Location: Endoscopy    Anesthesia Type: MAC    Airway Patency: patent    Postop Pain Control: adequate    Mental Status: mildly sedated but able to meaningfully participate in the post-anesthesia evaluation    Nausea/Vomiting: none    Cardiopulmonary/Hydration status: stable euvolemic    Complications: no apparent anesthesia related complications    Postop vital signs: stable    Dental Exam: Unchanged from Preop    Patient to be discharged home when criteria met.

## 2023-01-25 NOTE — DISCHARGE INSTRUCTIONS

## 2023-01-30 ENCOUNTER — APPOINTMENT (OUTPATIENT)
Dept: HEMATOLOGY/ONCOLOGY | Facility: HOSPITAL | Age: 43
End: 2023-01-30
Attending: INTERNAL MEDICINE
Payer: COMMERCIAL

## 2023-02-02 ENCOUNTER — OFFICE VISIT (OUTPATIENT)
Facility: LOCATION | Age: 43
End: 2023-02-02
Payer: COMMERCIAL

## 2023-02-02 ENCOUNTER — APPOINTMENT (OUTPATIENT)
Dept: HEMATOLOGY/ONCOLOGY | Facility: HOSPITAL | Age: 43
End: 2023-02-02
Attending: INTERNAL MEDICINE
Payer: COMMERCIAL

## 2023-02-02 VITALS — HEART RATE: 76 BPM | TEMPERATURE: 99 F

## 2023-02-02 DIAGNOSIS — K64.8 INTERNAL HEMORRHOIDS: Primary | ICD-10-CM

## 2023-02-02 DIAGNOSIS — D50.0 IRON DEFICIENCY ANEMIA DUE TO CHRONIC BLOOD LOSS: ICD-10-CM

## 2023-02-10 ENCOUNTER — APPOINTMENT (OUTPATIENT)
Dept: HEMATOLOGY/ONCOLOGY | Facility: HOSPITAL | Age: 43
End: 2023-02-10
Attending: INTERNAL MEDICINE
Payer: COMMERCIAL

## 2023-02-13 ENCOUNTER — APPOINTMENT (OUTPATIENT)
Dept: HEMATOLOGY/ONCOLOGY | Facility: HOSPITAL | Age: 43
End: 2023-02-13
Attending: INTERNAL MEDICINE
Payer: COMMERCIAL

## 2023-02-17 ENCOUNTER — OFFICE VISIT (OUTPATIENT)
Dept: HEMATOLOGY/ONCOLOGY | Facility: HOSPITAL | Age: 43
End: 2023-02-17
Attending: INTERNAL MEDICINE
Payer: COMMERCIAL

## 2023-02-17 VITALS
OXYGEN SATURATION: 99 % | RESPIRATION RATE: 18 BRPM | SYSTOLIC BLOOD PRESSURE: 146 MMHG | HEART RATE: 74 BPM | TEMPERATURE: 98 F | DIASTOLIC BLOOD PRESSURE: 89 MMHG

## 2023-02-17 DIAGNOSIS — T80.90XA INFUSION REACTION, INITIAL ENCOUNTER: Primary | ICD-10-CM

## 2023-02-17 DIAGNOSIS — D50.0 IRON DEFICIENCY ANEMIA DUE TO CHRONIC BLOOD LOSS: ICD-10-CM

## 2023-02-17 DIAGNOSIS — D50.0 IRON DEFICIENCY ANEMIA DUE TO CHRONIC BLOOD LOSS: Primary | ICD-10-CM

## 2023-02-17 PROCEDURE — 96375 TX/PRO/DX INJ NEW DRUG ADDON: CPT

## 2023-02-17 PROCEDURE — 96374 THER/PROPH/DIAG INJ IV PUSH: CPT

## 2023-02-17 PROCEDURE — 99215 OFFICE O/P EST HI 40 MIN: CPT | Performed by: CLINICAL NURSE SPECIALIST

## 2023-02-17 RX ORDER — METHYLPREDNISOLONE SODIUM SUCCINATE 125 MG/2ML
125 INJECTION, POWDER, LYOPHILIZED, FOR SOLUTION INTRAMUSCULAR; INTRAVENOUS ONCE
Status: COMPLETED | OUTPATIENT
Start: 2023-02-17 | End: 2023-02-17

## 2023-02-17 RX ADMIN — METHYLPREDNISOLONE SODIUM SUCCINATE 125 MG: 125 INJECTION, POWDER, LYOPHILIZED, FOR SOLUTION INTRAMUSCULAR; INTRAVENOUS at 15:41:00

## 2023-02-17 NOTE — PROGRESS NOTES
Education Record    Learner:  Patient and Spouse    Disease / Diagnosis: here for monoferric    Barriers / Limitations:  None   Comments:    Method:  Brief focused and Discussion   Comments:    General Topics:  Medication, Side effects and symptom management and Plan of care reviewed   Comments:    Outcome:  Shows understanding   Comments:    About 5 minutes after Monoferric started, pt c/o \"not feeling good\", felt like he was \"going to pass out\", facial /head flushing, SOB and facial itching. Monoferric stopped and 0.9NS wide open started. VSS. )2 2LNC applied. ALVIN Pringle notified and at chair side. Solumedrol 125mg given. Pt stated he felt better. Per Mark Banda, observe for 15-20 min and if VSS may discharged home. Pt states he had Venofer in the hospital in December and tolerated it well. Per Mark Banda, pt to come back for venofer on Monday. Teresa to discuss with Dr Estil Sacks. Reviewed next appointment. Pt states he feels good and VSS. Discharged home in stable condition, no new complaints.

## 2023-02-20 ENCOUNTER — OFFICE VISIT (OUTPATIENT)
Dept: HEMATOLOGY/ONCOLOGY | Facility: HOSPITAL | Age: 43
End: 2023-02-20
Attending: INTERNAL MEDICINE
Payer: COMMERCIAL

## 2023-02-20 VITALS
SYSTOLIC BLOOD PRESSURE: 115 MMHG | OXYGEN SATURATION: 100 % | RESPIRATION RATE: 18 BRPM | HEART RATE: 75 BPM | TEMPERATURE: 98 F | DIASTOLIC BLOOD PRESSURE: 73 MMHG

## 2023-02-20 PROCEDURE — 99211 OFF/OP EST MAY X REQ PHY/QHP: CPT

## 2023-02-20 NOTE — PROGRESS NOTES
Pt arrived for venofer infusion. Therapy plan changed from monoferric to venofer due to reaction. Insurance auth not yet obtained for venofer infusions. Pt aware. No treatment today.

## 2023-02-21 ENCOUNTER — OFFICE VISIT (OUTPATIENT)
Facility: LOCATION | Age: 43
End: 2023-02-21
Payer: COMMERCIAL

## 2023-02-21 VITALS — HEART RATE: 75 BPM | TEMPERATURE: 99 F

## 2023-02-21 DIAGNOSIS — K62.5 RECTAL BLEED: ICD-10-CM

## 2023-02-21 DIAGNOSIS — K64.8 INTERNAL HEMORRHOIDS: Primary | ICD-10-CM

## 2023-02-21 PROCEDURE — 99215 OFFICE O/P EST HI 40 MIN: CPT | Performed by: SURGERY

## 2023-02-21 RX ORDER — PRAMOXINE HYDROCHLORIDE HYDROCORTISONE ACETATE 100; 100 MG/10G; MG/10G
1 AEROSOL, FOAM TOPICAL 3 TIMES DAILY PRN
Qty: 1 EACH | Refills: 0 | Status: SHIPPED | OUTPATIENT
Start: 2023-02-21

## 2023-02-28 ENCOUNTER — TELEPHONE (OUTPATIENT)
Dept: HEMATOLOGY/ONCOLOGY | Facility: HOSPITAL | Age: 43
End: 2023-02-28

## 2023-02-28 NOTE — TELEPHONE ENCOUNTER
Called and left a message for the patient about the status of his PA for Venofer. If unable to obtain PA by the afternoon, his nurse will call to let him know, otherwise if no calls, then he is ok to proceed.

## 2023-03-01 ENCOUNTER — TELEPHONE (OUTPATIENT)
Dept: HEMATOLOGY/ONCOLOGY | Facility: HOSPITAL | Age: 43
End: 2023-03-01

## 2023-03-01 ENCOUNTER — APPOINTMENT (OUTPATIENT)
Dept: HEMATOLOGY/ONCOLOGY | Facility: HOSPITAL | Age: 43
End: 2023-03-01
Attending: INTERNAL MEDICINE
Payer: COMMERCIAL

## 2023-03-07 RX ORDER — HYDROCORTISONE 25 MG/G
1 CREAM TOPICAL 2 TIMES DAILY
Qty: 28 G | Refills: 0 | Status: SHIPPED | OUTPATIENT
Start: 2023-03-07

## 2023-03-09 ENCOUNTER — OFFICE VISIT (OUTPATIENT)
Dept: HEMATOLOGY/ONCOLOGY | Facility: HOSPITAL | Age: 43
End: 2023-03-09
Attending: INTERNAL MEDICINE
Payer: COMMERCIAL

## 2023-03-09 VITALS
DIASTOLIC BLOOD PRESSURE: 65 MMHG | TEMPERATURE: 98 F | OXYGEN SATURATION: 96 % | SYSTOLIC BLOOD PRESSURE: 105 MMHG | RESPIRATION RATE: 16 BRPM | HEART RATE: 83 BPM

## 2023-03-09 DIAGNOSIS — D50.0 IRON DEFICIENCY ANEMIA DUE TO CHRONIC BLOOD LOSS: Primary | ICD-10-CM

## 2023-03-09 PROCEDURE — 96374 THER/PROPH/DIAG INJ IV PUSH: CPT

## 2023-03-09 NOTE — PROGRESS NOTES
Education Record    Learner:  Patient and Spouse    Disease / Alvarez Natalia deficiency    Barriers / Limitations:  None   Comments:    Method:  Discussion   Comments:    General Topics:  Medication and Plan of care reviewed   Comments:    Outcome:  Shows understanding   Comments:observed for 30 minutes post infusion and vital signs recorded. appts scheduled for remaining venofer doses.

## 2023-03-13 ENCOUNTER — OFFICE VISIT (OUTPATIENT)
Dept: HEMATOLOGY/ONCOLOGY | Facility: HOSPITAL | Age: 43
End: 2023-03-13
Attending: INTERNAL MEDICINE
Payer: COMMERCIAL

## 2023-03-13 VITALS
HEART RATE: 54 BPM | TEMPERATURE: 98 F | DIASTOLIC BLOOD PRESSURE: 76 MMHG | OXYGEN SATURATION: 99 % | WEIGHT: 215 LBS | BODY MASS INDEX: 31 KG/M2 | SYSTOLIC BLOOD PRESSURE: 110 MMHG | RESPIRATION RATE: 16 BRPM

## 2023-03-13 DIAGNOSIS — D50.0 IRON DEFICIENCY ANEMIA DUE TO CHRONIC BLOOD LOSS: Primary | ICD-10-CM

## 2023-03-13 PROCEDURE — 96374 THER/PROPH/DIAG INJ IV PUSH: CPT

## 2023-03-13 NOTE — PROGRESS NOTES
Education Record    Learner:  Patient and Spouse    Disease / Diagnosis: anemia    Barriers / Limitations:  None   Comments:    Method:  Discussion and Printed material   Comments:    General Topics:  Plan of care reviewed   Comments:    Outcome:  Shows understanding   Comments:    Pt here for second venofer infusion. Observed for 30 mins post infusion. Tolerated well. Pt discharged in stable condition.

## 2023-03-14 ENCOUNTER — TELEPHONE (OUTPATIENT)
Facility: LOCATION | Age: 43
End: 2023-03-14

## 2023-03-14 DIAGNOSIS — M89.9 BONE LESION: Primary | ICD-10-CM

## 2023-03-14 NOTE — TELEPHONE ENCOUNTER
Pt was presccribed proctofoam and proctosol.   Wife Ryann Castillo called stating these are not covered by their insurance and is requesting a replacement be called in.  Mylene Coppola [phone]

## 2023-03-15 ENCOUNTER — APPOINTMENT (OUTPATIENT)
Dept: HEMATOLOGY/ONCOLOGY | Facility: HOSPITAL | Age: 43
End: 2023-03-15
Attending: INTERNAL MEDICINE
Payer: COMMERCIAL

## 2023-03-17 ENCOUNTER — OFFICE VISIT (OUTPATIENT)
Dept: HEMATOLOGY/ONCOLOGY | Facility: HOSPITAL | Age: 43
End: 2023-03-17
Attending: INTERNAL MEDICINE
Payer: COMMERCIAL

## 2023-03-17 VITALS
BODY MASS INDEX: 31 KG/M2 | DIASTOLIC BLOOD PRESSURE: 65 MMHG | WEIGHT: 214.63 LBS | HEART RATE: 55 BPM | RESPIRATION RATE: 18 BRPM | SYSTOLIC BLOOD PRESSURE: 125 MMHG | OXYGEN SATURATION: 99 % | TEMPERATURE: 97 F

## 2023-03-17 DIAGNOSIS — D50.0 IRON DEFICIENCY ANEMIA DUE TO CHRONIC BLOOD LOSS: Primary | ICD-10-CM

## 2023-03-17 PROCEDURE — 96374 THER/PROPH/DIAG INJ IV PUSH: CPT

## 2023-03-17 NOTE — PROGRESS NOTES
Education Record    Learner:  Patient    Disease / Auther Setter deficiency anemia due to chronic blood loss     Barriers / Limitations:  None   Comments:    Method:  Brief focused   Comments:    General Topics:  Infection, Medication, Pain, Precautions, Procedure, Side effects and symptom management, Plan of care reviewed and Fall risk and prevention   Comments:    Outcome:  Shows understanding   Comments: Tolerated iron well. Observed for 30 minutes  . VSS

## 2023-03-20 ENCOUNTER — OFFICE VISIT (OUTPATIENT)
Dept: HEMATOLOGY/ONCOLOGY | Facility: HOSPITAL | Age: 43
End: 2023-03-20
Attending: INTERNAL MEDICINE
Payer: COMMERCIAL

## 2023-03-20 VITALS
HEART RATE: 64 BPM | TEMPERATURE: 98 F | SYSTOLIC BLOOD PRESSURE: 109 MMHG | RESPIRATION RATE: 16 BRPM | OXYGEN SATURATION: 100 % | DIASTOLIC BLOOD PRESSURE: 64 MMHG

## 2023-03-20 DIAGNOSIS — D50.0 IRON DEFICIENCY ANEMIA DUE TO CHRONIC BLOOD LOSS: Primary | ICD-10-CM

## 2023-03-20 PROCEDURE — 96374 THER/PROPH/DIAG INJ IV PUSH: CPT

## 2023-03-20 RX ORDER — HYDROCORTISONE ACETATE 25 MG/1
25 SUPPOSITORY RECTAL DAILY
Qty: 14 SUPPOSITORY | Refills: 0 | Status: SHIPPED | OUTPATIENT
Start: 2023-03-20

## 2023-03-20 NOTE — PROGRESS NOTES
Education Record    Learner:  Patient    Disease / Diagnosis: SHAYLA    Barriers / Limitations:  None   Comments:    Method:  Brief focused and Reinforcement   Comments:    General Topics:  Diet, Medication, Side effects and symptom management and Plan of care reviewed   Comments:    Outcome:  Shows understanding   Comments: Venofer infused without any complications. Observed for half hour after infusion. Vital signs taken at the end of the 30-minute observation. Patient denied any complaints/issues. Discharged in good condition. Advised to call for any questions/concerns/issues.

## 2023-03-21 ENCOUNTER — APPOINTMENT (OUTPATIENT)
Dept: HEMATOLOGY/ONCOLOGY | Facility: HOSPITAL | Age: 43
End: 2023-03-21
Attending: INTERNAL MEDICINE
Payer: COMMERCIAL

## 2023-03-24 ENCOUNTER — OFFICE VISIT (OUTPATIENT)
Dept: HEMATOLOGY/ONCOLOGY | Facility: HOSPITAL | Age: 43
End: 2023-03-24
Attending: INTERNAL MEDICINE
Payer: COMMERCIAL

## 2023-03-24 VITALS — HEART RATE: 60 BPM | SYSTOLIC BLOOD PRESSURE: 113 MMHG | DIASTOLIC BLOOD PRESSURE: 73 MMHG

## 2023-03-24 DIAGNOSIS — D50.0 IRON DEFICIENCY ANEMIA DUE TO CHRONIC BLOOD LOSS: Primary | ICD-10-CM

## 2023-03-24 PROCEDURE — 96374 THER/PROPH/DIAG INJ IV PUSH: CPT

## 2023-03-24 NOTE — PROGRESS NOTES
Education Record    Learner:  Patient and Spouse    Disease / Diagnosis:iron deficiency    Barriers / Limitations:  None   Comments:    Method:  Discussion   Comments:    General Topics:  Medication and Plan of care reviewed   Comments:    Outcome:  Shows understanding   Comments:Appt scheduled for 8 weeks for labs. Patient verbalizes understanding.

## 2023-04-21 ENCOUNTER — HOSPITAL ENCOUNTER (OUTPATIENT)
Dept: NUCLEAR MEDICINE | Facility: HOSPITAL | Age: 43
Discharge: HOME | End: 2023-04-21
Attending: INTERNAL MEDICINE
Payer: COMMERCIAL

## 2023-04-21 DIAGNOSIS — M89.9 BONE LESION: ICD-10-CM

## 2023-04-21 LAB — GLUCOSE BLD-MCNC: 82 MG/DL (ref 70–99)

## 2023-04-21 PROCEDURE — 82962 GLUCOSE BLOOD TEST: CPT

## 2023-04-21 PROCEDURE — 78815 PET IMAGE W/CT SKULL-THIGH: CPT | Performed by: INTERNAL MEDICINE

## 2023-04-24 DIAGNOSIS — R93.2 ABNORMAL PET SCAN, LIVER: Primary | ICD-10-CM

## 2023-05-24 ENCOUNTER — PATIENT MESSAGE (OUTPATIENT)
Facility: LOCATION | Age: 43
End: 2023-05-24

## 2023-05-24 ENCOUNTER — PATIENT MESSAGE (OUTPATIENT)
Dept: HEMATOLOGY/ONCOLOGY | Facility: HOSPITAL | Age: 43
End: 2023-05-24

## 2023-05-25 NOTE — TELEPHONE ENCOUNTER
From: Gay Kennedy  To: Jossie Zavala MD  Sent: 5/24/2023 2:27 PM CDT  Subject: diet plan for anemia     Spoke to Luba Shields she is the dietitian for [facility] and was unable to tell us what type of foods should be eating due to my condition. Luba Shields said to contact you and as you what foods should be eating as I am still anemic.

## 2023-06-05 ENCOUNTER — NURSE ONLY (OUTPATIENT)
Dept: HEMATOLOGY/ONCOLOGY | Facility: HOSPITAL | Age: 43
End: 2023-06-05
Attending: INTERNAL MEDICINE
Payer: COMMERCIAL

## 2023-06-05 DIAGNOSIS — D50.0 IRON DEFICIENCY ANEMIA DUE TO CHRONIC BLOOD LOSS: ICD-10-CM

## 2023-06-05 LAB
BASOPHILS # BLD AUTO: 0.06 X10(3) UL (ref 0–0.2)
BASOPHILS NFR BLD AUTO: 1.1 %
DEPRECATED HBV CORE AB SER IA-ACNC: 2.3 NG/ML
EOSINOPHIL # BLD AUTO: 0.22 X10(3) UL (ref 0–0.7)
EOSINOPHIL NFR BLD AUTO: 4 %
ERYTHROCYTE [DISTWIDTH] IN BLOOD BY AUTOMATED COUNT: 16 %
FOLATE SERPL-MCNC: 19.4 NG/ML (ref 8.7–?)
HCT VFR BLD AUTO: 27.2 %
HGB BLD-MCNC: 7.8 G/DL
IMM GRANULOCYTES # BLD AUTO: 0.01 X10(3) UL (ref 0–1)
IMM GRANULOCYTES NFR BLD: 0.2 %
IRON SATN MFR SERPL: 57 %
IRON SERPL-MCNC: 267 UG/DL
LYMPHOCYTES # BLD AUTO: 2.17 X10(3) UL (ref 1–4)
LYMPHOCYTES NFR BLD AUTO: 39.2 %
MCH RBC QN AUTO: 20.9 PG (ref 26–34)
MCHC RBC AUTO-ENTMCNC: 28.7 G/DL (ref 31–37)
MCV RBC AUTO: 72.9 FL
MONOCYTES # BLD AUTO: 0.41 X10(3) UL (ref 0.1–1)
MONOCYTES NFR BLD AUTO: 7.4 %
NEUTROPHILS # BLD AUTO: 2.67 X10 (3) UL (ref 1.5–7.7)
NEUTROPHILS # BLD AUTO: 2.67 X10(3) UL (ref 1.5–7.7)
NEUTROPHILS NFR BLD AUTO: 48.1 %
PLATELET # BLD AUTO: 396 10(3)UL (ref 150–450)
RBC # BLD AUTO: 3.73 X10(6)UL
TIBC SERPL-MCNC: 468 UG/DL (ref 240–450)
TRANSFERRIN SERPL-MCNC: 314 MG/DL (ref 200–360)
VIT B12 SERPL-MCNC: 251 PG/ML (ref 193–986)
WBC # BLD AUTO: 5.5 X10(3) UL (ref 4–11)

## 2023-06-05 PROCEDURE — 83521 IG LIGHT CHAINS FREE EACH: CPT

## 2023-06-05 PROCEDURE — 85240 CLOT FACTOR VIII AHG 1 STAGE: CPT

## 2023-06-05 PROCEDURE — 85025 COMPLETE CBC W/AUTO DIFF WBC: CPT

## 2023-06-05 PROCEDURE — 84165 PROTEIN E-PHORESIS SERUM: CPT

## 2023-06-05 PROCEDURE — 83550 IRON BINDING TEST: CPT

## 2023-06-05 PROCEDURE — 86334 IMMUNOFIX E-PHORESIS SERUM: CPT

## 2023-06-05 PROCEDURE — 82728 ASSAY OF FERRITIN: CPT

## 2023-06-05 PROCEDURE — 83540 ASSAY OF IRON: CPT

## 2023-06-05 PROCEDURE — 82607 VITAMIN B-12: CPT

## 2023-06-05 PROCEDURE — 36415 COLL VENOUS BLD VENIPUNCTURE: CPT

## 2023-06-05 PROCEDURE — 82746 ASSAY OF FOLIC ACID SERUM: CPT

## 2023-06-05 PROCEDURE — 85245 CLOT FACTOR VIII VW RISTOCTN: CPT

## 2023-06-05 PROCEDURE — 85246 CLOT FACTOR VIII VW ANTIGEN: CPT

## 2023-06-07 LAB
ALBUMIN SERPL ELPH-MCNC: 4.12 G/DL (ref 3.75–5.21)
ALBUMIN/GLOB SERPL: 1.81 {RATIO} (ref 1–2)
ALPHA1 GLOB SERPL ELPH-MCNC: 0.26 G/DL (ref 0.19–0.46)
ALPHA2 GLOB SERPL ELPH-MCNC: 0.51 G/DL (ref 0.48–1.05)
B-GLOBULIN SERPL ELPH-MCNC: 0.66 G/DL (ref 0.68–1.23)
FACTOR VIII ACT: 83 %
GAMMA GLOB SERPL ELPH-MCNC: 0.85 G/DL (ref 0.62–1.7)
KAPPA LC FREE SER-MCNC: 2.19 MG/DL (ref 0.33–1.94)
KAPPA LC FREE/LAMBDA FREE SER NEPH: 1.12 {RATIO} (ref 0.26–1.65)
LAMBDA LC FREE SERPL-MCNC: 1.95 MG/DL (ref 0.57–2.63)
PROT SERPL-MCNC: 6.4 G/DL (ref 6.4–8.2)
VWF ACTIVITY: 45 %
VWF AG: 60 %

## 2023-06-12 ENCOUNTER — OFFICE VISIT (OUTPATIENT)
Dept: HEMATOLOGY/ONCOLOGY | Facility: HOSPITAL | Age: 43
End: 2023-06-12
Attending: INTERNAL MEDICINE
Payer: COMMERCIAL

## 2023-06-12 VITALS
WEIGHT: 202 LBS | SYSTOLIC BLOOD PRESSURE: 102 MMHG | DIASTOLIC BLOOD PRESSURE: 66 MMHG | RESPIRATION RATE: 18 BRPM | OXYGEN SATURATION: 100 % | BODY MASS INDEX: 29 KG/M2 | TEMPERATURE: 98 F | HEART RATE: 63 BPM

## 2023-06-12 VITALS — DIASTOLIC BLOOD PRESSURE: 65 MMHG | SYSTOLIC BLOOD PRESSURE: 105 MMHG | HEART RATE: 70 BPM

## 2023-06-12 DIAGNOSIS — D50.0 IRON DEFICIENCY ANEMIA DUE TO CHRONIC BLOOD LOSS: Primary | ICD-10-CM

## 2023-06-12 PROCEDURE — 96365 THER/PROPH/DIAG IV INF INIT: CPT

## 2023-06-12 PROCEDURE — 99214 OFFICE O/P EST MOD 30 MIN: CPT | Performed by: CLINICAL NURSE SPECIALIST

## 2023-06-12 NOTE — PROGRESS NOTES
Education Record    Learner:  Patient and Spouse    Disease / Diagnosis: Iron Deficiency Anemia due to chronic blood loss    Barriers / Limitations:  None   Comments:    Method:  Brief focused and Reinforcement   Comments:    General Topics:  Medication and Plan of care reviewed   Comments:    Outcome:  Shows understanding   Comments:  Venofer infused without any complications. Observed for half hour after infusion. Vital signs taken at the end of the 30-minute observation. Patient denied any complaints/issues. Discharged in good condition. Advised to call for any questions/concerns/issues. Plan: Venofer 300 mg weekly x 3 doses. Per ALVIN Paul - labs 8 weeks after last dose and follow up with Dr. Rachna Chao after. Patient and spouse aware of plan of care and verbalized understanding. All appointments requested.

## 2023-06-14 ENCOUNTER — ANESTHESIA EVENT (OUTPATIENT)
Dept: ENDOSCOPY | Facility: HOSPITAL | Age: 43
End: 2023-06-14
Payer: COMMERCIAL

## 2023-06-14 ENCOUNTER — HOSPITAL ENCOUNTER (OUTPATIENT)
Dept: MRI IMAGING | Facility: HOSPITAL | Age: 43
Discharge: HOME | End: 2023-06-14
Attending: INTERNAL MEDICINE
Payer: COMMERCIAL

## 2023-06-14 ENCOUNTER — HOSPITAL ENCOUNTER (OUTPATIENT)
Facility: HOSPITAL | Age: 43
Setting detail: HOSPITAL OUTPATIENT SURGERY
Discharge: HOME | End: 2023-06-14
Attending: INTERNAL MEDICINE | Admitting: INTERNAL MEDICINE
Payer: COMMERCIAL

## 2023-06-14 ENCOUNTER — ANESTHESIA (OUTPATIENT)
Dept: ENDOSCOPY | Facility: HOSPITAL | Age: 43
End: 2023-06-14
Payer: COMMERCIAL

## 2023-06-14 VITALS
RESPIRATION RATE: 16 BRPM | BODY MASS INDEX: 28.77 KG/M2 | HEIGHT: 70 IN | DIASTOLIC BLOOD PRESSURE: 96 MMHG | OXYGEN SATURATION: 100 % | WEIGHT: 201 LBS | TEMPERATURE: 97 F | HEART RATE: 68 BPM | SYSTOLIC BLOOD PRESSURE: 132 MMHG

## 2023-06-14 DIAGNOSIS — R93.2 ABNORMAL PET SCAN, LIVER: ICD-10-CM

## 2023-06-14 PROCEDURE — A9581 GADOXETATE DISODIUM INJ: HCPCS | Performed by: INTERNAL MEDICINE

## 2023-06-14 PROCEDURE — 06LY8CC OCCLUSION OF HEMORRHOIDAL PLEXUS WITH EXTRALUMINAL DEVICE, VIA NATURAL OR ARTIFICIAL OPENING ENDOSCOPIC: ICD-10-PCS | Performed by: INTERNAL MEDICINE

## 2023-06-14 PROCEDURE — 74183 MRI ABD W/O CNTR FLWD CNTR: CPT | Performed by: INTERNAL MEDICINE

## 2023-06-14 RX ORDER — SODIUM CHLORIDE, SODIUM LACTATE, POTASSIUM CHLORIDE, CALCIUM CHLORIDE 600; 310; 30; 20 MG/100ML; MG/100ML; MG/100ML; MG/100ML
INJECTION, SOLUTION INTRAVENOUS CONTINUOUS
Status: DISCONTINUED | OUTPATIENT
Start: 2023-06-14 | End: 2023-06-14

## 2023-06-14 RX ORDER — ONDANSETRON 2 MG/ML
INJECTION INTRAMUSCULAR; INTRAVENOUS
Status: COMPLETED
Start: 2023-06-14 | End: 2023-06-14

## 2023-06-14 RX ORDER — ONDANSETRON 2 MG/ML
4 INJECTION INTRAMUSCULAR; INTRAVENOUS ONCE
Status: COMPLETED | OUTPATIENT
Start: 2023-06-14 | End: 2023-06-14

## 2023-06-14 RX ADMIN — SODIUM CHLORIDE, SODIUM LACTATE, POTASSIUM CHLORIDE, CALCIUM CHLORIDE: 600; 310; 30; 20 INJECTION, SOLUTION INTRAVENOUS at 08:36:00

## 2023-06-14 NOTE — ANESTHESIA POSTPROCEDURE EVALUATION
[address] [facility] Patient Status:  Hospital Outpatient Surgery   Age/Gender 39year old male MRN [id number]   Location [address] [facility] 28 Attending Steven Whitney, [address] Day # 0 PCP Deb Schaefer MD       Anesthesia Post-op Note    FLEXIBLE SIGMOIDOSCOPY with hemorrhoid banding X3    Procedure Summary     Date: 06/14/23 Room / Location: 1404 [facility] ENDOSCOPY 02 / [address] [facility] ENDOSCOPY    Anesthesia Start: 5887 Anesthesia Stop: 2197    Procedure: Michael Cantu with hemorrhoid banding X3 Diagnosis:       Hemorrhoids, unspecified hemorrhoid type      (Hemorrhoids)    Surgeons: Steven Whitney DO Anesthesiologist: Dieter Zabala MD    Anesthesia Type: MAC ASA Status: 3          Anesthesia Type: No value filed. Vitals Value Taken Time   /60 06/14/23 0835   Temp n 06/14/23 0838   Pulse 73 06/14/23 0838   Resp 14 06/14/23 0838   SpO2 100 % 06/14/23 0838   Vitals shown include unvalidated device data. Patient Location: Endoscopy    Anesthesia Type: MAC    Airway Patency: patent    Postop Pain Control: adequate    Mental Status: mildly sedated but able to meaningfully participate in the post-anesthesia evaluation    Nausea/Vomiting: none    Cardiopulmonary/Hydration status: stable euvolemic    Complications: no apparent anesthesia related complications    Postop vital signs: stable    Dental Exam: Unchanged from Preop    Patient to be discharged home when criteria met.

## 2023-06-14 NOTE — DISCHARGE INSTRUCTIONS

## 2023-06-19 ENCOUNTER — OFFICE VISIT (OUTPATIENT)
Dept: HEMATOLOGY/ONCOLOGY | Facility: HOSPITAL | Age: 43
End: 2023-06-19
Attending: INTERNAL MEDICINE
Payer: COMMERCIAL

## 2023-06-19 VITALS
SYSTOLIC BLOOD PRESSURE: 110 MMHG | WEIGHT: 201.38 LBS | BODY MASS INDEX: 29 KG/M2 | DIASTOLIC BLOOD PRESSURE: 66 MMHG | OXYGEN SATURATION: 99 % | HEART RATE: 53 BPM | TEMPERATURE: 97 F | RESPIRATION RATE: 16 BRPM

## 2023-06-19 DIAGNOSIS — D50.0 IRON DEFICIENCY ANEMIA DUE TO CHRONIC BLOOD LOSS: Primary | ICD-10-CM

## 2023-06-19 PROCEDURE — 96365 THER/PROPH/DIAG IV INF INIT: CPT

## 2023-06-19 NOTE — PROGRESS NOTES
Education Record    Learner:  Patient    Disease / Diagnosis: SHAYLA    Barriers / Limitations:  None   Comments:    Method:  Brief focused and Reinforcement   Comments:    General Topics:  Plan of care reviewed   Comments:    Outcome:  Shows understanding   Comments: Venofer infused without any complications. Observed for half hour after infusion. Vital signs taken at the end of the 30-minute observation. Patient denied any complaints/issues. Discharged in good condition. Advised to call for any questions/concerns/issues.

## 2023-06-26 ENCOUNTER — OFFICE VISIT (OUTPATIENT)
Dept: HEMATOLOGY/ONCOLOGY | Facility: HOSPITAL | Age: 43
End: 2023-06-26
Attending: INTERNAL MEDICINE
Payer: COMMERCIAL

## 2023-06-26 VITALS
SYSTOLIC BLOOD PRESSURE: 123 MMHG | TEMPERATURE: 98 F | WEIGHT: 196.38 LBS | BODY MASS INDEX: 28 KG/M2 | OXYGEN SATURATION: 100 % | HEART RATE: 49 BPM | DIASTOLIC BLOOD PRESSURE: 75 MMHG | RESPIRATION RATE: 16 BRPM

## 2023-06-26 DIAGNOSIS — D50.0 IRON DEFICIENCY ANEMIA DUE TO CHRONIC BLOOD LOSS: Primary | ICD-10-CM

## 2023-06-26 PROCEDURE — 96365 THER/PROPH/DIAG IV INF INIT: CPT

## 2023-08-22 ENCOUNTER — APPOINTMENT (OUTPATIENT)
Dept: HEMATOLOGY/ONCOLOGY | Facility: HOSPITAL | Age: 43
End: 2023-08-22
Attending: INTERNAL MEDICINE
Payer: COMMERCIAL

## 2023-08-28 ENCOUNTER — NURSE ONLY (OUTPATIENT)
Dept: HEMATOLOGY/ONCOLOGY | Facility: HOSPITAL | Age: 43
End: 2023-08-28
Attending: INTERNAL MEDICINE
Payer: COMMERCIAL

## 2023-08-28 DIAGNOSIS — D50.0 IRON DEFICIENCY ANEMIA DUE TO CHRONIC BLOOD LOSS: ICD-10-CM

## 2023-08-28 LAB
BASOPHILS # BLD AUTO: 0.07 X10(3) UL (ref 0–0.2)
BASOPHILS NFR BLD AUTO: 0.9 %
DEPRECATED HBV CORE AB SER IA-ACNC: 6.1 NG/ML
EOSINOPHIL # BLD AUTO: 0.26 X10(3) UL (ref 0–0.7)
EOSINOPHIL NFR BLD AUTO: 3.4 %
ERYTHROCYTE [DISTWIDTH] IN BLOOD BY AUTOMATED COUNT: 21.9 %
HCT VFR BLD AUTO: 39.3 %
HGB BLD-MCNC: 12.8 G/DL
HGB RETIC QN AUTO: 32.4 PG (ref 28.2–36.6)
IMM GRANULOCYTES # BLD AUTO: 0.02 X10(3) UL (ref 0–1)
IMM GRANULOCYTES NFR BLD: 0.3 %
IMM RETICS NFR: 0.13 RATIO (ref 0.1–0.3)
IRON SATN MFR SERPL: 11 %
IRON SERPL-MCNC: 45 UG/DL
LYMPHOCYTES # BLD AUTO: 2.72 X10(3) UL (ref 1–4)
LYMPHOCYTES NFR BLD AUTO: 35.9 %
MCH RBC QN AUTO: 25.3 PG (ref 26–34)
MCHC RBC AUTO-ENTMCNC: 32.6 G/DL (ref 31–37)
MCV RBC AUTO: 77.8 FL
MONOCYTES # BLD AUTO: 0.68 X10(3) UL (ref 0.1–1)
MONOCYTES NFR BLD AUTO: 9 %
NEUTROPHILS # BLD AUTO: 3.83 X10 (3) UL (ref 1.5–7.7)
NEUTROPHILS # BLD AUTO: 3.83 X10(3) UL (ref 1.5–7.7)
NEUTROPHILS NFR BLD AUTO: 50.5 %
PLATELET # BLD AUTO: 248 10(3)UL (ref 150–450)
RBC # BLD AUTO: 5.05 X10(6)UL
RETICS # AUTO: 49.5 X10(3) UL (ref 22.5–147.5)
RETICS/RBC NFR AUTO: 1 %
TIBC SERPL-MCNC: 402 UG/DL (ref 240–450)
TRANSFERRIN SERPL-MCNC: 270 MG/DL (ref 200–360)
WBC # BLD AUTO: 7.6 X10(3) UL (ref 4–11)

## 2023-08-28 PROCEDURE — 83540 ASSAY OF IRON: CPT

## 2023-08-28 PROCEDURE — 82728 ASSAY OF FERRITIN: CPT

## 2023-08-28 PROCEDURE — 83550 IRON BINDING TEST: CPT

## 2023-08-28 PROCEDURE — 85045 AUTOMATED RETICULOCYTE COUNT: CPT

## 2023-08-28 PROCEDURE — 36415 COLL VENOUS BLD VENIPUNCTURE: CPT

## 2023-08-28 PROCEDURE — 85025 COMPLETE CBC W/AUTO DIFF WBC: CPT

## 2023-09-12 ENCOUNTER — TELEPHONE (OUTPATIENT)
Dept: HEMATOLOGY/ONCOLOGY | Facility: HOSPITAL | Age: 43
End: 2023-09-12

## 2023-09-13 ENCOUNTER — APPOINTMENT (OUTPATIENT)
Dept: HEMATOLOGY/ONCOLOGY | Facility: HOSPITAL | Age: 43
End: 2023-09-13
Attending: INTERNAL MEDICINE
Payer: COMMERCIAL

## 2023-09-26 ENCOUNTER — OFFICE VISIT (OUTPATIENT)
Facility: LOCATION | Age: 43
End: 2023-09-26
Payer: COMMERCIAL

## 2023-09-26 VITALS — WEIGHT: 195 LBS | HEIGHT: 70 IN | TEMPERATURE: 98 F | HEART RATE: 55 BPM | BODY MASS INDEX: 27.92 KG/M2

## 2023-09-26 DIAGNOSIS — K64.4 INTERNAL AND EXTERNAL BLEEDING HEMORRHOIDS: Primary | ICD-10-CM

## 2023-09-26 DIAGNOSIS — D50.0 IRON DEFICIENCY ANEMIA DUE TO CHRONIC BLOOD LOSS: ICD-10-CM

## 2023-09-26 DIAGNOSIS — K64.8 INTERNAL AND EXTERNAL BLEEDING HEMORRHOIDS: Primary | ICD-10-CM

## 2023-09-26 DIAGNOSIS — K64.9 HEMORRHOIDS, UNSPECIFIED HEMORRHOID TYPE: ICD-10-CM

## 2023-09-26 PROCEDURE — 3008F BODY MASS INDEX DOCD: CPT | Performed by: STUDENT IN AN ORGANIZED HEALTH CARE EDUCATION/TRAINING PROGRAM

## 2023-09-26 PROCEDURE — 99204 OFFICE O/P NEW MOD 45 MIN: CPT | Performed by: STUDENT IN AN ORGANIZED HEALTH CARE EDUCATION/TRAINING PROGRAM

## 2023-09-27 ENCOUNTER — TELEPHONE (OUTPATIENT)
Dept: HEMATOLOGY/ONCOLOGY | Facility: HOSPITAL | Age: 43
End: 2023-09-27

## 2023-10-05 ENCOUNTER — OFFICE VISIT (OUTPATIENT)
Dept: HEMATOLOGY/ONCOLOGY | Facility: HOSPITAL | Age: 43
End: 2023-10-05
Attending: INTERNAL MEDICINE
Payer: COMMERCIAL

## 2023-10-05 VITALS
DIASTOLIC BLOOD PRESSURE: 68 MMHG | RESPIRATION RATE: 16 BRPM | BODY MASS INDEX: 29 KG/M2 | OXYGEN SATURATION: 100 % | SYSTOLIC BLOOD PRESSURE: 111 MMHG | HEART RATE: 54 BPM | TEMPERATURE: 98 F | WEIGHT: 201.19 LBS

## 2023-10-05 DIAGNOSIS — D50.0 IRON DEFICIENCY ANEMIA DUE TO CHRONIC BLOOD LOSS: Primary | ICD-10-CM

## 2023-10-05 PROCEDURE — 96365 THER/PROPH/DIAG IV INF INIT: CPT

## 2023-10-05 NOTE — PROGRESS NOTES
Pt here for iron infusion. Arrives Ambulating independently, accompanied by Other self           Modifications in dose or schedule: No     Frequency of blood return and site check throughout administration: Prior to administration   Discharged to Home, Ambulating independently, accompanied by: Other self    Outpatient Oncology Care Plan  Problem list:  anemia  Problems related to:    disease/disease progression  Interventions:  administered iron  Expected outcomes:  optimal lab values  Progress towards outcome:  making progress    Education Record    Learner:  Patient  Barriers / Limitations:  None  Method:  Brief focused  Outcome:  Shows understanding  Comments:observed pt 30 min post infusion. Pt tolerated infusion. No c/o. VSS. See flowsheet    Pt tolerated venofer. VSS. Appt in 8 weeks with MD and possible venofer infusion.

## 2023-10-19 ENCOUNTER — TELEPHONE (OUTPATIENT)
Facility: LOCATION | Age: 43
End: 2023-10-19

## 2023-10-19 NOTE — TELEPHONE ENCOUNTER
Pt spouse called to ask how to fill out TOM. Assisted her with how to fill out TOM. Logged disab forms for processing.  Pt seeking disab for upcoming SX.

## 2023-11-06 NOTE — TELEPHONE ENCOUNTER
Dr. Ron Thomas,     *The ACKNOWLEDGE button has been moved to the top right ribbon*    Please sign off on form if you agree to: Disab for surgery 12/4/23-2-4 wks post op  (place your signature on the first page only)    -From your Inbasket, Highlight the patient and click Chart   -Double click the 43/52/14 Forms Completion telephone encounter  -Scroll down to the Media section   -Click the blue Hyperlink: Disab Dr Ron Thomas 98/1/40  -Click Acknowledge located in the top right ribbon/menu   -Drag the mouse into the blank space of the document and a + sign will appear. Left click to   electronically sign the document. Thank you,    Bela Cuellar.

## 2023-11-08 ENCOUNTER — LAB ENCOUNTER (OUTPATIENT)
Dept: LAB | Facility: HOSPITAL | Age: 43
End: 2023-11-08
Attending: STUDENT IN AN ORGANIZED HEALTH CARE EDUCATION/TRAINING PROGRAM
Payer: COMMERCIAL

## 2023-11-08 DIAGNOSIS — D50.0 IRON DEFICIENCY ANEMIA DUE TO CHRONIC BLOOD LOSS: Primary | ICD-10-CM

## 2023-11-08 DIAGNOSIS — D50.0 IRON DEFICIENCY ANEMIA DUE TO CHRONIC BLOOD LOSS: ICD-10-CM

## 2023-11-08 LAB
BASOPHILS # BLD AUTO: 0.06 X10(3) UL (ref 0–0.2)
BASOPHILS NFR BLD AUTO: 0.8 %
DEPRECATED HBV CORE AB SER IA-ACNC: 4.8 NG/ML
EOSINOPHIL # BLD AUTO: 0.37 X10(3) UL (ref 0–0.7)
EOSINOPHIL NFR BLD AUTO: 5.2 %
ERYTHROCYTE [DISTWIDTH] IN BLOOD BY AUTOMATED COUNT: 15.7 %
HCT VFR BLD AUTO: 35.2 %
HGB BLD-MCNC: 10.1 G/DL
HGB RETIC QN AUTO: 22 PG (ref 28.2–36.6)
IMM GRANULOCYTES # BLD AUTO: 0.03 X10(3) UL (ref 0–1)
IMM GRANULOCYTES NFR BLD: 0.4 %
IMM RETICS NFR: 0.27 RATIO (ref 0.1–0.3)
IRON SATN MFR SERPL: 5 %
IRON SERPL-MCNC: 26 UG/DL
LYMPHOCYTES # BLD AUTO: 2.65 X10(3) UL (ref 1–4)
LYMPHOCYTES NFR BLD AUTO: 37.5 %
MCH RBC QN AUTO: 22.8 PG (ref 26–34)
MCHC RBC AUTO-ENTMCNC: 28.7 G/DL (ref 31–37)
MCV RBC AUTO: 79.5 FL
MONOCYTES # BLD AUTO: 0.53 X10(3) UL (ref 0.1–1)
MONOCYTES NFR BLD AUTO: 7.5 %
NEUTROPHILS # BLD AUTO: 3.43 X10 (3) UL (ref 1.5–7.7)
NEUTROPHILS # BLD AUTO: 3.43 X10(3) UL (ref 1.5–7.7)
NEUTROPHILS NFR BLD AUTO: 48.6 %
PLATELET # BLD AUTO: 287 10(3)UL (ref 150–450)
RBC # BLD AUTO: 4.43 X10(6)UL
RETICS # AUTO: 43.9 X10(3) UL (ref 22.5–147.5)
RETICS/RBC NFR AUTO: 1 %
TIBC SERPL-MCNC: 478 UG/DL (ref 240–450)
TRANSFERRIN SERPL-MCNC: 321 MG/DL (ref 200–360)
WBC # BLD AUTO: 7.1 X10(3) UL (ref 4–11)

## 2023-11-08 PROCEDURE — 83550 IRON BINDING TEST: CPT

## 2023-11-08 PROCEDURE — 83540 ASSAY OF IRON: CPT

## 2023-11-08 PROCEDURE — 85025 COMPLETE CBC W/AUTO DIFF WBC: CPT

## 2023-11-08 PROCEDURE — 82728 ASSAY OF FERRITIN: CPT

## 2023-11-08 PROCEDURE — 85045 AUTOMATED RETICULOCYTE COUNT: CPT

## 2023-11-08 PROCEDURE — 36415 COLL VENOUS BLD VENIPUNCTURE: CPT

## 2023-11-27 ENCOUNTER — TELEPHONE (OUTPATIENT)
Facility: LOCATION | Age: 43
End: 2023-11-27

## 2023-11-27 DIAGNOSIS — K64.9 HEMORRHOIDS, UNSPECIFIED HEMORRHOID TYPE: Primary | ICD-10-CM

## 2023-11-29 NOTE — PROGRESS NOTES
[facility] Hematology and Oncology Clinic Note    Visit Diagnosis:  No diagnosis found. History of Present Illness: 43M with a PMH of CVA and anemia was referred by [facility] for anemia.     -He presented to the ER on 12/24/22 with weakness. He was noted to have a Hb of 7.4. He was given IV Iron and PRBCs. He presented to the ER in 01/2023 for hematochezia. In the ER, his hemoglobin was 9.3 and MCV 78. In 12/2022, his ferritin was 1.9, FE sat 2, TIBC 511. CT CAP from 11/2022, noted to have hepatic hemangiomas, fatty liver, large amount of food debris in stomach. He endorses a history of hemorrhoids. He has a history of bleeding hemorrhoids. No epistaxis or gum bleeding. He feels better after his blood transfusion but still weak and light headed. Occasional night sweats.     -In 2022, he has had multiple EGD/Colonoscopies which did not show a GI source. He met with . [facility] is scheduled for a repeat Colon/EGD with possible hemorrhoidal banding +/- VCE.     -IV Moferric 1000 mg x 1: 02/2023    -IV Venofer 200 mg x 5: 03/2023    -IV Venofer 300 mg x 2: 06/2023    -IV Venofer 300 mg x 1: 10/2023    -IV Venofer 300 mg x 3: planned. Starts  11/30/23    Interval history:  -Met with surgery-discussed hemorrhoidectomy. This is scheduled for 2/19/24  -Feels better after venofer  -Bleeding is starting to slow down   -Labs from 11/2023 c/w with SHAYLA, ferritin 4, Hb 10  -Plan for Venofer today     Review of Systems: [address] was completed and pertinent positives are in the HPI    No current outpatient medications on file prior to visit. No current facility-administered medications on file prior to visit.      Past Medical History:   Diagnosis Date    Abdominal pain 01/13/2023    First day of pain    Acute, but ill-defined, cerebrovascular disease 01/02/2021    We really do not know when    Anemia 01/01/2022    Back pain 01/01/1996    Accident    Black stools 01/01/2014    Was with previous GI dr    Blood disorder anemic- seeing Hematologist for first time 1/24/23    Blood in the stool 01/01/2014    Was with previous GI dr Palmer chau 01/01/1994    Have several    Dizziness 01/01/2022    Been weak dizy lose blood almost daily    Excessive bleeding 01/01/2014    When going poop    Glaucoma 07/15/2016    Surgery    Headache disorder 07/15/2016    Seeing neuro     Hemorrhoids 01/01/2014    First noticed issues    History of blood transfusion     Jaundice 12/24/2022    Went to ER    Lab test positive for detection of COVID-19 virus     11/2021 NO HOSPITALIZATION- CHILLS,FEVER, loss taste ,smell, body aches    Migraines     Mini stroke     Nausea 01/13/2023    Stroke [facility])     TIA- unsure when MRI revealed    Weight loss 12/24/2022     Past Surgical History:   Procedure Laterality Date    COLONOSCOPY  01/31/2022    Last one    COLONOSCOPY N/A 01/25/2023    Procedure: COLONOSCOPY with Hemorrhoid BANDING X3, ESOPHAGOGASTRODUODENOSCOPY (EGD) WITH BIOPSIES;  Surgeon: Vanessa Lopez DO;  Location: [address] [place] [address] ENDOSCOPY    COLONOSCOPY  06/2023    flex sig w hemorrhoid banding     Social History     Socioeconomic History    Marital status:    Tobacco Use    Smoking status: Never    Smokeless tobacco: Never   Vaping Use    Vaping Use: Never used   Substance and Sexual Activity    Alcohol use: Yes     Alcohol/week: 3.0 standard drinks of alcohol     Types: 3 Cans of beer per week     Comment: occasionally    Drug use: Yes     Frequency: 35.0 times per week     Types: Cannabis     Comment: Daily use/ flower      No family history on file.     Physical Exam  Height: --  Weight: --  BSA (Calculated - sq m): --  Pulse: --  BP: --  Temp: --  Do Not Use - Resp Rate: --  SpO2: --       General: NAD, AOX3  HEENT: clear op, mmm, no jvd, no scleral icterus  CV: RR  Extremities: No edema   Lungs: no increased work of breathing  Abd: soft nt nd +BS no hepatosplenomegaly  Neuro: CN: II-XII grossly intact    Results:  Lab Results   Component Value Date    WBC 7.1 11/08/2023    HGB 10.1 (L) 11/08/2023    HCT 35.2 (L) 11/08/2023    MCV 79.5 (L) 11/08/2023    .0 11/08/2023     Lab Results   Component Value Date     01/17/2023    K 3.6 01/17/2023    CO2 28.0 01/17/2023     01/17/2023    BUN 16 01/17/2023    ALB 4.12 06/05/2023       No results found for: \"LDH\"    Radiology: reviewed     Pathology: reviewed     Assessment and Plan:  42M with a PMH of CVA and anemia was referred by [facility] for anemia. Iron Deficiency anemia  -Likely from hemorrhoidal bleeding-plan for hemorrhoidectomy in 02/2024  -S/p extensive GI work up  -Repeat CBC/Fe studies c/w SHAYLA. Plan for IV venofer 300 mg x 3  -Repeat CBC and Fe studies in 8 weeks (around 01/30/24)     Left frontal bone enhancing lesion  -Per radiology review, appears to represent an atypical hemangioma   -CT CAP unremarkable and PET/CT unremarkable   -Following with neurology  -SPEP unremarkable     MDM: high-IV Iron    [facility]

## 2023-11-30 ENCOUNTER — OFFICE VISIT (OUTPATIENT)
Dept: HEMATOLOGY/ONCOLOGY | Facility: HOSPITAL | Age: 43
End: 2023-11-30
Attending: INTERNAL MEDICINE
Payer: COMMERCIAL

## 2023-11-30 VITALS
SYSTOLIC BLOOD PRESSURE: 135 MMHG | TEMPERATURE: 98 F | RESPIRATION RATE: 18 BRPM | BODY MASS INDEX: 29 KG/M2 | DIASTOLIC BLOOD PRESSURE: 81 MMHG | WEIGHT: 202.38 LBS | OXYGEN SATURATION: 100 % | HEART RATE: 73 BPM

## 2023-11-30 VITALS — SYSTOLIC BLOOD PRESSURE: 116 MMHG | HEART RATE: 64 BPM | DIASTOLIC BLOOD PRESSURE: 84 MMHG

## 2023-11-30 DIAGNOSIS — D50.0 IRON DEFICIENCY ANEMIA DUE TO CHRONIC BLOOD LOSS: Primary | ICD-10-CM

## 2023-11-30 PROCEDURE — 99214 OFFICE O/P EST MOD 30 MIN: CPT | Performed by: INTERNAL MEDICINE

## 2023-11-30 PROCEDURE — 96365 THER/PROPH/DIAG IV INF INIT: CPT

## 2023-11-30 NOTE — PROGRESS NOTES
Pt here for iron infusion. Arrives Ambulating independently, accompanied by Other self           Modifications in dose or schedule: No     Frequency of blood return and site check throughout administration: Prior to administration   Discharged to Home, Ambulating independently, accompanied by: Other self    Outpatient Oncology Care Plan  Problem list:  anemia  Problems related to:    disease/disease progression  Interventions:  administered iron  Expected outcomes:  optimal lab values  Progress towards outcome:  making progress    Education Record    Learner:  Patient  Barriers / Limitations:  None  Method:  Brief focused  Outcome:  Shows understanding  Comments:observed pt 30 min post infusion. Pt tolerated infusion. No c/o. VSS. See flowsheet    Here for venofer 300mg, has had before.  Needs three doses total. Per billing, Bela Godinez still in review but most likely should be approved since it has in past. Note placed in next appt note to verify Bela Godinez is complete

## 2023-11-30 NOTE — PROGRESS NOTES
Education Record    Learner:  Patient    Disease / Diagnosis:SHAYLA    Barriers / Limitations:  None   Comments:    Method:  Discussion   Comments:    General Topics:  Plan of care reviewed   Comments:    Outcome:  Shows understanding   Comments:    Patient here for follow-up and IV iron. Seen by MD only.

## 2023-12-08 ENCOUNTER — OFFICE VISIT (OUTPATIENT)
Dept: HEMATOLOGY/ONCOLOGY | Facility: HOSPITAL | Age: 43
End: 2023-12-08
Attending: INTERNAL MEDICINE
Payer: COMMERCIAL

## 2023-12-08 VITALS — SYSTOLIC BLOOD PRESSURE: 134 MMHG | DIASTOLIC BLOOD PRESSURE: 74 MMHG | HEART RATE: 60 BPM

## 2023-12-08 DIAGNOSIS — D50.0 IRON DEFICIENCY ANEMIA DUE TO CHRONIC BLOOD LOSS: Primary | ICD-10-CM

## 2023-12-08 PROCEDURE — 96365 THER/PROPH/DIAG IV INF INIT: CPT

## 2023-12-08 NOTE — PROGRESS NOTES
Pt here for iron infusion. Arrives Ambulating independently, accompanied by Other self           Modifications in dose or schedule: No     Frequency of blood return and site check throughout administration: Prior to administration   Discharged to Home, Ambulating independently, accompanied by: Other self    Outpatient Oncology Care Plan  Problem list:  anemia  Problems related to:    disease/disease progression  Interventions:  administered iron  Expected outcomes:  optimal lab values  Progress towards outcome:  making progress    Education Record    Learner:  Patient  Barriers / Limitations:  None  Method:  Brief focused  Outcome:  Shows understanding  Comments:observed pt 30 min post infusion. Pt tolerated infusion. No c/o. VSS. See flowsheet    Pt tolerated venofer 300 mg today. Left in stable condition. Appt in place. VSS after monitoring period.

## 2023-12-15 ENCOUNTER — OFFICE VISIT (OUTPATIENT)
Dept: HEMATOLOGY/ONCOLOGY | Facility: HOSPITAL | Age: 43
End: 2023-12-15
Attending: INTERNAL MEDICINE
Payer: COMMERCIAL

## 2023-12-15 VITALS
SYSTOLIC BLOOD PRESSURE: 108 MMHG | HEART RATE: 61 BPM | DIASTOLIC BLOOD PRESSURE: 63 MMHG | RESPIRATION RATE: 16 BRPM | TEMPERATURE: 96 F | OXYGEN SATURATION: 98 %

## 2023-12-15 DIAGNOSIS — D50.0 IRON DEFICIENCY ANEMIA DUE TO CHRONIC BLOOD LOSS: Primary | ICD-10-CM

## 2023-12-15 PROCEDURE — 96365 THER/PROPH/DIAG IV INF INIT: CPT

## 2023-12-15 NOTE — PROGRESS NOTES
Pt here for Venofer . Pt denies any issues or concerns. Ordering MD: Dr. Skip Roche Exp: dose ordered were 3 - today is his 3rd dose then will do labs in 8 weeks     Pt tolerated infusion without difficulty or complaint. Reviewed next apt date/time: Yes, reminded patient to have labs done in 8 weeks. PL appointment set up for 2/9/24 at 3:30 PM. Patient aware of this appointment. Education Record  Learner:  Patient and Spouse  Disease / Diagnosis: Iron Deficiency Anemia  Barriers / Limitations:  None  Method:  Brief focused and Reinforcement  General Topics:  Plan of care reviewed  Outcome:  Shows understanding  Venofer infused without any complications. Observed for half hour after infusion. Vital signs taken at the end of the 30-minute observation. Patient denied any complaints/issues. Discharged in good condition. Advised to call for any questions/concerns/issues.

## 2024-01-20 ENCOUNTER — HOSPITAL ENCOUNTER (EMERGENCY)
Facility: HOSPITAL | Age: 44
Discharge: HOME | End: 2024-01-20
Attending: EMERGENCY MEDICINE
Payer: COMMERCIAL

## 2024-01-20 VITALS
HEART RATE: 61 BPM | RESPIRATION RATE: 16 BRPM | TEMPERATURE: 98 F | DIASTOLIC BLOOD PRESSURE: 66 MMHG | SYSTOLIC BLOOD PRESSURE: 122 MMHG | OXYGEN SATURATION: 95 % | HEIGHT: 70 IN | BODY MASS INDEX: 29.35 KG/M2 | WEIGHT: 205 LBS

## 2024-01-20 DIAGNOSIS — L02.91 ABSCESS: Primary | ICD-10-CM

## 2024-01-20 PROCEDURE — 10060 I&D ABSCESS SIMPLE/SINGLE: CPT

## 2024-01-20 PROCEDURE — 99284 EMERGENCY DEPT VISIT MOD MDM: CPT

## 2024-01-20 PROCEDURE — 99283 EMERGENCY DEPT VISIT LOW MDM: CPT

## 2024-01-20 RX ORDER — CLINDAMYCIN HYDROCHLORIDE 150 MG/1
300 CAPSULE ORAL ONCE
Status: COMPLETED | OUTPATIENT
Start: 2024-01-20 | End: 2024-01-20

## 2024-01-20 RX ORDER — CLINDAMYCIN HYDROCHLORIDE 300 MG/1
300 CAPSULE ORAL 4 TIMES DAILY
Qty: 40 CAPSULE | Refills: 0 | Status: SHIPPED | OUTPATIENT
Start: 2024-01-20 | End: 2024-01-30

## 2024-01-20 NOTE — ED INITIAL ASSESSMENT (HPI)
Patient arrived to the ED from home with c/o abscess to left groin since Wednesday. Patient states the abscess popped this morning and is draining pus. Denies fevers, chills, body aches. AAOx4 and rates pain 2/10.

## 2024-01-20 NOTE — DISCHARGE INSTRUCTIONS
Follow-up with a general surgeon in the next 1 week for wound check.  Return for spreading redness, fevers above 100.4 Fahrenheit or any other concerning symptoms.  Take the antibiotic as prescribed.

## 2024-01-20 NOTE — ED PROVIDER NOTES
Patient Seen in: [facility] Emergency Department      History     Chief Complaint   Patient presents with    Abscess     Stated Complaint: abscess to left groin since wednesday, pop today with pus    Subjective:   HPI    43-year-old male presents today for evaluation of a left inguinal abscess.  Patient noticed a red bump in his left lateral groin on Wednesday.  Today, 30 minutes prior to arrival, the area ruptured and patient had purulent bloody discharge.  Patient denies any fevers or vomiting.    Objective:   Past Medical History:   Diagnosis Date    Abdominal pain 01/13/2023    First day of pain    Acute, but ill-defined, cerebrovascular disease 01/02/2021    We really do not know when    Anemia 01/01/2022    Back pain 01/01/1996    Accident    Black stools 01/01/2014    Was with previous GI     Blood disorder     anemic- seeing Hematologist for first time 1/24/23    Blood in the stool 01/01/2014    Was with previous GI dr Yip tattoo 01/01/1994    Have several    Dizziness 01/01/2022    Been weak dizy lose blood almost daily    Excessive bleeding 01/01/2014    When going poop    Glaucoma 07/15/2016    Surgery    Headache disorder 07/15/2016    Seeing neuro     Hemorrhoids 01/01/2014    First noticed issues    History of blood transfusion     Jaundice 12/24/2022    Went to ER    Lab test positive for detection of COVID-19 virus     11/2021 NO HOSPITALIZATION- CHILLS,FEVER, loss taste ,smell, body aches    Migraines     Mini stroke     Nausea 01/13/2023    Stroke (HCC)     TIA- unsure when MRI revealed    Weight loss 12/24/2022              Past Surgical History:   Procedure Laterality Date    COLONOSCOPY  01/31/2022    Last one    COLONOSCOPY N/A 01/25/2023    Procedure: COLONOSCOPY with Hemorrhoid BANDING X3, ESOPHAGOGASTRODUODENOSCOPY (EGD) WITH BIOPSIES;  Surgeon: Chris Hernandez DO;  Location:  ENDOSCOPY    COLONOSCOPY  06/2023    flex sig w hemorrhoid banding                Social  History     Socioeconomic History    Marital status:    Tobacco Use    Smoking status: Never    Smokeless tobacco: Never   Vaping Use    Vaping Use: Never used   Substance and Sexual Activity    Alcohol use: Yes     Alcohol/week: 3.0 standard drinks of alcohol     Types: 3 Cans of beer per week     Comment: occasionally    Drug use: Yes     Frequency: 35.0 times per week     Types: Cannabis     Comment: Daily use/ flower   Other Topics Concern    Caffeine Concern Yes     Comment: 1 cup coffee daily    Exercise Yes     Comment: at least 1x per week, active at work              Review of Systems    Positive for stated complaint: abscess to left groin since wednesday, pop today with pus  Other systems are as noted in HPI.  Constitutional and vital signs reviewed.      All other systems reviewed and negative except as noted above.    Physical Exam     ED Triage Vitals   BP 01/20/24 1151 122/66   Pulse 01/20/24 1151 70   Resp 01/20/24 1151 16   Temp 01/20/24 1156 98.1 °F (36.7 °C)   Temp src 01/20/24 1156 Oral   SpO2 01/20/24 1151 97 %   O2 Device 01/20/24 1215 None (Room air)       Current:/66   Pulse 61   Temp 98.1 °F (36.7 °C) (Oral)   Resp 16   Ht 177.8 cm (5' 10\")   Wt 93 kg   SpO2 95%   BMI 29.41 kg/m²         Physical Exam  Vitals and nursing note reviewed.   Constitutional:       Appearance: Normal appearance.   HENT:      Head: Normocephalic.      Nose: Nose normal.      Mouth/Throat:      Mouth: Mucous membranes are moist.   Eyes:      Extraocular Movements: Extraocular movements intact.   Cardiovascular:      Rate and Rhythm: Normal rate.   Pulmonary:      Effort: Pulmonary effort is normal.   Abdominal:      General: Abdomen is flat.   Musculoskeletal:         General: Normal range of motion.   Skin:     General: Skin is warm.      Comments: Approximately 3 cm area of induration with spontaneously draining bloody purulent drainage over the lateral aspect.  There are some mild overlying  erythema.   Neurological:      General: No focal deficit present.      Mental Status: He is alert.   Psychiatric:         Mood and Affect: Mood normal.               ED Course   Labs Reviewed - No data to display                   MDM      Differential Diagnosis  43-year-old male presents today for evaluation of a left groin redness and swelling that spontaneously ruptured at home.  The area is just anterior and medial to his left iliac crest.  Upon pressing on the area, there is some bloody purulent discharge that returns.  Differential includes abscess and cellulitis.  I discussed risk benefits of incision and drainage in the ED. verbal consent was given.  I extended the spontaneous opening by approximately half a centimeter.  there was a small amount of purulent drainage.  The abscess was then irrigated.  Surrounding the abscess, there is a small halo of what I think is cellulitis.  Patient will benefit from oral antibiotics.  I counseled patient and wife on symptomatic management for home along with ED return precautions.  I encouraged surgical follow-up next week for wound check.  Both feel comfortable with plan, will DC.    Patient mentioned an intermittent abdominal pain in his right abdomen that has been occurring for several months.  According to both he and his wife, he was evaluated for this by his outpatient care team.  He does not have any pain right now.  His abdomen is soft without any tenderness, guarding or rigidity.  I discussed evaluating his abdominal pain here in the ER, including CT and labs.  Both respectfully declined evaluation.  With him being pain-free with the reassuring belly exam along his request, will not pursue any further evaluation today.                                   Medical Decision Making      Disposition and Plan     Clinical Impression:  1. Abscess         Disposition:  Discharge  1/20/2024 12:09 pm    Follow-up:  Felicity Malloy MD  [address]  [address]  [place]  [place] [id number]  [phone]    Call in 1 day(s)            Medications Prescribed:  Discharge Medication List as of 1/20/2024 12:23 PM        START taking these medications    Details   clindamycin 300 MG Oral Cap Take 1 capsule (300 mg total) by mouth 4 (four) times daily for 10 days., Normal, Disp-40 capsule, R-0

## 2024-01-29 NOTE — PROGRESS NOTES
[facility] Note    Visit Diagnosis:  No diagnosis found.    History of Present Illness: 43M with a PMH of CVA and anemia was referred by Mary for anemia.     -He presented to the ER on 12/24/22 with weakness. He was noted to have a Hb of 7.4. He was given IV Iron and PRBCs. He presented to the ER in 01/2023 for hematochezia. In the ER, his hemoglobin was 9.3 and MCV 78. In 12/2022, his ferritin was 1.9, FE sat 2, TIBC 511. CT CAP from 11/2022, noted to have hepatic hemangiomas, fatty liver, large amount of food debris in stomach. He endorses a history of hemorrhoids. He has a history of bleeding hemorrhoids. No epistaxis or gum bleeding. He feels better after his blood transfusion but still weak and light headed. Occasional night sweats.     -In 2022, he has had multiple EGD/Colonoscopies which did not show a GI source. He met with Dr. Hernandez is scheduled for a repeat Colon/EGD with possible hemorrhoidal banding +/- VCE.     -IV Moferric 1000 mg x 1: 02/2023    -IV Venofer 200 mg x 5: 03/2023    -IV Venofer 300 mg x 2: 06/2023    -IV Venofer 300 mg x 1: 10/2023    -IV Venofer 300 mg x 3: 12/2023    Interval history: 1/30/24  -Met with surgery-discussed hemorrhoidectomy. This is scheduled for 2/19/24      Review of Systems: 12 Point ROS was completed and pertinent positives are in the HPI    Current Outpatient Medications on File Prior to Visit   Medication Sig Dispense Refill    clindamycin 300 MG Oral Cap Take 1 capsule (300 mg total) by mouth 4 (four) times daily for 10 days. 40 capsule 0     Current Facility-Administered Medications on File Prior to Visit   Medication Dose Route Frequency Provider Last Rate Last Admin    [COMPLETED] clindamycin (Cleocin) cap 300 mg  300 mg Oral Once Adithya Melchor MD   300 mg at 01/20/24 1220     Past Medical History:   Diagnosis Date    Abdominal pain 01/13/2023    First day of pain    Acute, but ill-defined, cerebrovascular disease  01/02/2021    We really do not know when    Anemia 01/01/2022    Back pain 01/01/1996    Accident    Black stools 01/01/2014    Was with previous GI     Blood disorder     anemic- seeing Hematologist for first time 1/24/23    Blood in the stool 01/01/2014    Was with previous GI dr    Decorative tattoo 01/01/1994    Have several    Dizziness 01/01/2022    Been weak dizy lose blood almost daily    Excessive bleeding 01/01/2014    When going poop    Glaucoma 07/15/2016    Surgery    Headache disorder 07/15/2016    Seeing neuro     Hemorrhoids 01/01/2014    First noticed issues    History of blood transfusion     Jaundice 12/24/2022    Went to ER    Lab test positive for detection of COVID-19 virus     11/2021 NO HOSPITALIZATION- CHILLS,FEVER, loss taste ,smell, body aches    Migraines     Mini stroke     Nausea 01/13/2023    Stroke (HCC)     TIA- unsure when MRI revealed    Weight loss 12/24/2022     Past Surgical History:   Procedure Laterality Date    COLONOSCOPY  01/31/2022    Last one    COLONOSCOPY N/A 01/25/2023    Procedure: COLONOSCOPY with Hemorrhoid BANDING X3, ESOPHAGOGASTRODUODENOSCOPY (EGD) WITH BIOPSIES;  Surgeon: Chris Hernandez DO;  Location:  ENDOSCOPY    COLONOSCOPY  06/2023    flex sig w hemorrhoid banding     Social History     Socioeconomic History    Marital status:    Tobacco Use    Smoking status: Never    Smokeless tobacco: Never   Vaping Use    Vaping Use: Never used   Substance and Sexual Activity    Alcohol use: Yes     Alcohol/week: 3.0 standard drinks of alcohol     Types: 3 Cans of beer per week     Comment: occasionally    Drug use: Yes     Frequency: 35.0 times per week     Types: Cannabis     Comment: Daily use/ flower      No family history on file.    Physical Exam  Height: --  Weight: --  BSA (Calculated - sq m): --  Pulse: --  BP: --  Temp: --  Do Not Use - Resp Rate: --  SpO2: --       General: NAD, AOX3  HEENT: clear op, mmm, no jvd, no scleral icterus  CV:  RR  Extremities: No edema   Lungs: no increased work of breathing  Abd: soft nt nd +BS no hepatosplenomegaly  Neuro: CN: II-XII grossly intact    Results:  Lab Results   Component Value Date    WBC 7.1 11/08/2023    HGB 10.1 (L) 11/08/2023    HCT 35.2 (L) 11/08/2023    MCV 79.5 (L) 11/08/2023    .0 11/08/2023     Lab Results   Component Value Date     01/17/2023    K 3.6 01/17/2023    CO2 28.0 01/17/2023     01/17/2023    BUN 16 01/17/2023    ALB 4.12 06/05/2023       No results found for: \"LDH\"    Radiology: reviewed     Pathology: reviewed     Assessment and Plan:  42M with a PMH of CVA and anemia was referred by Mary for anemia.    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

## 2024-01-30 ENCOUNTER — APPOINTMENT (OUTPATIENT)
Dept: HEMATOLOGY/ONCOLOGY | Facility: HOSPITAL | Age: 44
End: 2024-01-30
Attending: INTERNAL MEDICINE
Payer: COMMERCIAL

## 2024-02-07 ENCOUNTER — OFFICE VISIT (OUTPATIENT)
Dept: NEUROLOGY | Facility: CLINIC | Age: 44
End: 2024-02-07
Payer: COMMERCIAL

## 2024-02-07 VITALS
HEART RATE: 71 BPM | RESPIRATION RATE: 16 BRPM | WEIGHT: 199 LBS | OXYGEN SATURATION: 97 % | DIASTOLIC BLOOD PRESSURE: 74 MMHG | BODY MASS INDEX: 29 KG/M2 | SYSTOLIC BLOOD PRESSURE: 116 MMHG

## 2024-02-07 DIAGNOSIS — Z86.73 OLD CEREBELLAR INFARCT WITHOUT LATE EFFECT: ICD-10-CM

## 2024-02-07 DIAGNOSIS — M89.9 FRONTAL SKULL LESION: ICD-10-CM

## 2024-02-07 PROCEDURE — 3074F SYST BP LT 130 MM HG: CPT | Performed by: OTHER

## 2024-02-07 PROCEDURE — 99214 OFFICE O/P EST MOD 30 MIN: CPT | Performed by: OTHER

## 2024-02-07 PROCEDURE — 3078F DIAST BP <80 MM HG: CPT | Performed by: OTHER

## 2024-02-07 NOTE — PATIENT INSTRUCTIONS
Refill policies:    Allow 2-3 business days for refills; controlled substances may take longer.  Contact your pharmacy at least 5 days prior to running out of medication and have them send an electronic request or submit request through the “request refill” option in your [employer] account.  Refills are not addressed on weekends; covering physicians do not authorize routine medications on weekends.  No narcotics or controlled substances are refilled after noon on Fridays or by on call physicians.  By law, narcotics must be electronically prescribed.  A 30 day supply with no refills is the maximum allowed.  If your prescription is due for a refill, you may be due for a follow up appointment.  To best provide you care, patients receiving routine medications need to be seen at least once a year.  Patients receiving narcotic/controlled substance medications need to be seen at least once every 3 months.  In the event that your preferred pharmacy does not have the requested medication in stock (e.g. Backordered), it is your responsibility to find another pharmacy that has the requested medication available.  We will gladly send a new prescription to that pharmacy at your request.    Scheduling Tests:    If your physician has ordered radiology tests such as MRI or CT scans, please contact Central Scheduling at [phone] right away to schedule the test.  Once scheduled, the [facility] Centralized Referral Team will work with your insurance carrier to obtain pre-certification or prior authorization.  Depending on your insurance carrier, approval may take 3-10 days.  It is highly recommended patients assure they have received an authorization before having a test performed.  If test is done without insurance authorization, patient may be responsible for the entire amount billed.      Precertification and Prior Authorizations:  If your physician has recommended that you have a procedure or additional testing performed the [facility]  Centralized Referral Team will contact your insurance carrier to obtain pre-certification or prior authorization.    You are strongly encouraged to contact your insurance carrier to verify that your procedure/test has been approved and is a COVERED benefit.  Although the [facility] Centralized Referral Team does its due diligence, the insurance carrier gives the disclaimer that \"Although the procedure is authorized, this does not guarantee payment.\"    Ultimately the patient is responsible for payment.   Thank you for your understanding in this matter.  Paperwork Completion:  If you require FMLA or disability paperwork for your recovery, please make sure to either drop it off or have it faxed to our office at [phone]. Be sure the form has your name and date of birth on it.  The form will be faxed to our Forms Department and they will complete it for you.  There is a 25$ fee for all forms that need to be filled out.  Please be aware there is a 10-14 day turnaround time.  You will need to sign a release of information (TOM) form if your paperwork does not come with one.  You may call the Forms Department with any questions at [phone].  Their fax number is [phone].

## 2024-02-07 NOTE — PROGRESS NOTES
HPI:    Patient ID: Luis Daniel Headley is a 43 year old male.      Neurologic Problem  Associated symptoms include headaches.      Patient is a 43 year old male who presents for follow up. He was last seen Oct 2022 and missed office appointment. States for the last 1 year was having chronic blood loss and iron deficiency anemia from hemorrhoids, had multiple rounds of banding and has plan for hemorrhoidectomy. Aspirin on hold due to rectal bleeding  States as recommended saw Hem/Onc for left frontal bone lesion and had CT C/A/P and PET scan unremarkable. Radiology thinks it is likely atypical hemangioma. No headaches reported.        Last office visit: Oct 2022  Macho Headley is a 42 year old male who presented for evaluation of MRI brain finding of old small cerebellar infarct and left frontal skull lesion. States he has been having headaches intermittently for last 5-6 years. Headache are located on the left side, random sharp pain in the left frontal area and tingling sensation that comes and goes. He states head pain is brief in duration and without any associated symptoms. States he had an MRI brain 5-6 years ago at the time of onset of headache and the MRI brain was unremarkable. His PCP repeated another MRI brain last month which shows small chronic appearing cerebellar infarcts and a new expansile 1.5 cm enhancing lesion within the left frontal calvarium. 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 He complaints of unintentional weight loss.No history of any known cancer.           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   -CT CAP unremarkable and PET/CT unremarkable   -Following with neurology  -SPEP unremarkable             HISTORY:  Past Medical  History:   Diagnosis Date    Abdominal pain 01/13/2023    First day of pain    Acute, but ill-defined, cerebrovascular disease 01/02/2021    We really do not know when    Anemia 01/01/2022    Back pain 01/01/1996    Accident    Black stools 01/01/2014    Was with previous GI     Blood disorder     anemic- seeing Hematologist for first time 1/24/23    Blood in the stool 01/01/2014    Was with previous GI dr Phi fordtoo 01/01/1994    Have several    Dizziness 01/01/2022    Been weak dizy lose blood almost daily    Excessive bleeding 01/01/2014    When going poop    Glaucoma 07/15/2016    Surgery    Headache disorder 07/15/2016    Seeing neuro     Hemorrhoids 01/01/2014    First noticed issues    History of blood transfusion     Jaundice 12/24/2022    Went to ER    Lab test positive for detection of COVID-19 virus     11/2021 NO HOSPITALIZATION- CHILLS,FEVER, loss taste ,smell, body aches    Migraines     Mini stroke     Nausea 01/13/2023    PONV (postoperative nausea and vomiting)     Weight loss 12/24/2022      Past Surgical History:   Procedure Laterality Date    COLONOSCOPY  01/31/2022    Last one    COLONOSCOPY N/A 01/25/2023    Procedure: COLONOSCOPY with Hemorrhoid BANDING X3, ESOPHAGOGASTRODUODENOSCOPY (EGD) WITH BIOPSIES;  Surgeon: Chris Hernandez DO;  Location:  ENDOSCOPY    COLONOSCOPY  06/2023    flex sig w hemorrhoid banding      No family history on file.   Social History     Socioeconomic History    Marital status:    Tobacco Use    Smoking status: Some Days     Types: Cigarettes    Smokeless tobacco: Never   Vaping Use    Vaping Use: Never used   Substance and Sexual Activity    Alcohol use: Yes     Alcohol/week: 3.0 standard drinks of alcohol     Types: 3 Cans of beer per week     Comment: occasionally    Drug use: Yes     Frequency: 35.0 times per week     Types: Cannabis     Comment: Daily use/ flower   Other Topics Concern    Caffeine Concern Yes     Comment: 1 cup coffee  daily    Exercise Yes     Comment: at least 1x per week, active at work        Review of Systems   Constitutional: Negative.    HENT: Negative.     Eyes: Negative.    Respiratory: Negative.     Cardiovascular: Negative.    Gastrointestinal: Negative.    Endocrine: Negative.    Genitourinary: Negative.    Musculoskeletal: Negative.    Skin: Negative.    Allergic/Immunologic: Negative.    Neurological:  Positive for headaches.   Hematological: Negative.    Psychiatric/Behavioral: Negative.     All other systems reviewed and are negative.           No current outpatient medications on file.     Allergies:No Known Allergies  PHYSICAL EXAM:   Physical Exam  Blood pressure 116/74, pulse 71, resp. rate 16, weight 199 lb (90.3 kg), SpO2 97%.      General Appearance: Well nourished, well developed, no apparent distress.   HEENT: Normocephalic and atraumatic. No palpable mass   Neck: Normal range of motion. Neck supple.  Cardiovascular: Normal rate, regular rhythm and normal heart sounds.    Pulmonary/Chest: Effort normal and breath sounds normal.   Abdominal: Soft. Bowel sounds are normal.   Ext: no edema or cyanosis.  Skin: dry, clean and intact    Neurological: Patient is awake, alert and oriented to person, place and time   Normal memory, attention/concentration, speech and language.    Cranial Nerves: II: Visual acuity: normal  II: Visual fields: normal  III: Pupils: equal, round, reactive to light  III,IV,VI: Extra Ocular Movements: intact  V: Facial sensation: intact  VII: Facial strength: intact  VIII: Hearing: intact  IX: Palate: intact  XI: Shoulder shrug: intact  XII: Tongue movement: normal    Motor Exam: Normal tone. Strength is  5 out of 5 in all extremities bilaterally.  DTR: present    Sensory: Sensory examination is normal to light touch and pinprick     Coordination: Finger-to-nose, heel-to-shin, and rapid alternating movements are normal bilaterally without evidence of dysmetria.    Gait: Casual, toe, heel,  and tandem gait are normal.       TESTS/IMAGING:     MRI brain w and wo contrast: [facility] 9/20/2022    No acute intracranial abnormality seen  Small chronic appearing cerebellar infarct  A new expansile 1.5 cm enhancing lesion within the left frontal calvarium is nonspecific with differential considerations including benign or malignant etiology as well as metastatic disease.  Correlation with CT is recommended to evaluate osseous details.    ASSESSMENT/PLAN:       ICD-10-CM    1. Old cerebellar infarct without late effect  Z86.73       2. Frontal skull lesion  M89.9           1. Left frontal skull lesion  CT CAP unremarkable and PET/CT unremarkable   Last CT head Dec 2022- stable left frontal skull lesion  Observation for now  Will repeat MRI brain w and wo contrast       2. Old small cerebellar infarct- ?small vessel disease  No known vascular risk factors and no history of any trauma or s/s to indicates any previous vertebral dissection  ECHO with bubbles unremarkable  Aspirin on hold due to GI bleed    Follow up in 1 year. Will call with MRI results    Wilmer Casey MD  [facility] [place]      This note was prepared using Dragon Medical voice recognition dictation software. As a result errors may occur. When identified these errors have been corrected. While every attempt is made to correct errors during dictation discrepancies may still exist         Meds This Visit:  Requested Prescriptions      No prescriptions requested or ordered in this encounter       Imaging & Referrals:  None     ID#[id number]

## 2024-02-08 ENCOUNTER — TELEPHONE (OUTPATIENT)
Facility: LOCATION | Age: 44
End: 2024-02-08

## 2024-02-08 NOTE — TELEPHONE ENCOUNTER
Case is approved   Id #[id number] Status solved  Priority Normal  What is your request about? Precertification Request  Admission Type Outpatient  TPA, Health Plan or Payer Name [facility]  Inpatient Setting --  Bed Level --  Service Type OP Surgery  Dollar Amount --  Level of Injection --  Member Name KHUSHBOO GARAY  Member  1980  Member ID - Alpha Not Listed  Numeric Portion of Member ID [id number]  Member ID --  Relationship to Insured Self  Patient Name KHUSHBOO GARAY  Patient Date of Birth 1980  Patient Address [address] [address]  Patient Phone Number [phone]  Member Employer or Insurance Group [facility] [employer] FUN[employer]  Group ID [id number]  Requester Name CARLOS HALL  Requester Email [email or web address]  Physician Fax Number [phone]  Requester Phone Number [phone]  [facility]  Physician Address [address] [facility] [address]  Physician Phone Number [phone]  Physician Fax Number [phone]  Physician Tax ID [id number]  Physician NPI [id number]  Facility Name [facility]  Facility Address [address] [facility] [address]  Facility Tax ID [id number]  Facility Phone Number [phone]  Facility Fax Number [phone]  Date of Service 2024  Total Billed Charges --  Procedure Code 81245  Diagnosis Code (ICD 10) k64.9

## 2024-02-09 ENCOUNTER — NURSE ONLY (OUTPATIENT)
Dept: HEMATOLOGY/ONCOLOGY | Facility: HOSPITAL | Age: 44
End: 2024-02-09
Attending: INTERNAL MEDICINE
Payer: COMMERCIAL

## 2024-02-09 DIAGNOSIS — D50.0 IRON DEFICIENCY ANEMIA DUE TO CHRONIC BLOOD LOSS: ICD-10-CM

## 2024-02-09 LAB
BASOPHILS # BLD AUTO: 0.06 X10(3) UL (ref 0–0.2)
BASOPHILS NFR BLD AUTO: 0.9 %
DEPRECATED HBV CORE AB SER IA-ACNC: 8.6 NG/ML
EOSINOPHIL # BLD AUTO: 0.19 X10(3) UL (ref 0–0.7)
EOSINOPHIL NFR BLD AUTO: 2.9 %
ERYTHROCYTE [DISTWIDTH] IN BLOOD BY AUTOMATED COUNT: 19.7 %
HCT VFR BLD AUTO: 40.5 %
HGB BLD-MCNC: 12.9 G/DL
IMM GRANULOCYTES # BLD AUTO: 0.01 X10(3) UL (ref 0–1)
IMM GRANULOCYTES NFR BLD: 0.2 %
IRON SATN MFR SERPL: 9 %
IRON SERPL-MCNC: 37 UG/DL
LYMPHOCYTES # BLD AUTO: 2.4 X10(3) UL (ref 1–4)
LYMPHOCYTES NFR BLD AUTO: 36.6 %
MCH RBC QN AUTO: 25.5 PG (ref 26–34)
MCHC RBC AUTO-ENTMCNC: 31.9 G/DL (ref 31–37)
MCV RBC AUTO: 80 FL
MONOCYTES # BLD AUTO: 0.51 X10(3) UL (ref 0.1–1)
MONOCYTES NFR BLD AUTO: 7.8 %
NEUTROPHILS # BLD AUTO: 3.38 X10 (3) UL (ref 1.5–7.7)
NEUTROPHILS # BLD AUTO: 3.38 X10(3) UL (ref 1.5–7.7)
NEUTROPHILS NFR BLD AUTO: 51.6 %
PLATELET # BLD AUTO: 265 10(3)UL (ref 150–450)
RBC # BLD AUTO: 5.06 X10(6)UL
TIBC SERPL-MCNC: 404 UG/DL (ref 240–450)
TRANSFERRIN SERPL-MCNC: 271 MG/DL (ref 200–360)
WBC # BLD AUTO: 6.6 X10(3) UL (ref 4–11)

## 2024-02-09 PROCEDURE — 82728 ASSAY OF FERRITIN: CPT

## 2024-02-09 PROCEDURE — 36415 COLL VENOUS BLD VENIPUNCTURE: CPT

## 2024-02-09 PROCEDURE — 83550 IRON BINDING TEST: CPT

## 2024-02-09 PROCEDURE — 85025 COMPLETE CBC W/AUTO DIFF WBC: CPT

## 2024-02-09 PROCEDURE — 83540 ASSAY OF IRON: CPT

## 2024-02-15 ENCOUNTER — TELEPHONE (OUTPATIENT)
Dept: HEMATOLOGY/ONCOLOGY | Facility: HOSPITAL | Age: 44
End: 2024-02-15

## 2024-02-15 NOTE — TELEPHONE ENCOUNTER
Left message for patient notifying him that we are still waiting on authorization for his iron infusions. It is going through a labor fund--billing said this can sometimes take up to a week or longer. Appt cancelled for tomorrow. Will reach out once we have authorization

## 2024-02-16 ENCOUNTER — APPOINTMENT (OUTPATIENT)
Dept: HEMATOLOGY/ONCOLOGY | Facility: HOSPITAL | Age: 44
End: 2024-02-16
Attending: INTERNAL MEDICINE
Payer: COMMERCIAL

## 2024-02-19 ENCOUNTER — HOSPITAL ENCOUNTER (OUTPATIENT)
Facility: HOSPITAL | Age: 44
Setting detail: HOSPITAL OUTPATIENT SURGERY
Discharge: HOME | End: 2024-02-19
Attending: STUDENT IN AN ORGANIZED HEALTH CARE EDUCATION/TRAINING PROGRAM | Admitting: STUDENT IN AN ORGANIZED HEALTH CARE EDUCATION/TRAINING PROGRAM
Payer: COMMERCIAL

## 2024-02-19 ENCOUNTER — ANESTHESIA EVENT (OUTPATIENT)
Dept: SURGERY | Facility: HOSPITAL | Age: 44
End: 2024-02-19
Payer: COMMERCIAL

## 2024-02-19 ENCOUNTER — ANESTHESIA (OUTPATIENT)
Dept: SURGERY | Facility: HOSPITAL | Age: 44
End: 2024-02-19
Payer: COMMERCIAL

## 2024-02-19 VITALS
TEMPERATURE: 98 F | DIASTOLIC BLOOD PRESSURE: 96 MMHG | OXYGEN SATURATION: 100 % | WEIGHT: 199 LBS | SYSTOLIC BLOOD PRESSURE: 131 MMHG | BODY MASS INDEX: 28.49 KG/M2 | HEIGHT: 70 IN | RESPIRATION RATE: 18 BRPM | HEART RATE: 61 BPM

## 2024-02-19 DIAGNOSIS — G89.18 POST-OPERATIVE PAIN: Primary | ICD-10-CM

## 2024-02-19 DIAGNOSIS — K64.9 HEMORRHOIDS, UNSPECIFIED HEMORRHOID TYPE: ICD-10-CM

## 2024-02-19 PROCEDURE — 46260 REMOVE IN/EX HEM GROUPS 2+: CPT | Performed by: STUDENT IN AN ORGANIZED HEALTH CARE EDUCATION/TRAINING PROGRAM

## 2024-02-19 PROCEDURE — 06BY0ZC EXCISION OF HEMORRHOIDAL PLEXUS, OPEN APPROACH: ICD-10-PCS | Performed by: STUDENT IN AN ORGANIZED HEALTH CARE EDUCATION/TRAINING PROGRAM

## 2024-02-19 RX ORDER — HEPARIN SODIUM 5000 [USP'U]/ML
5000 INJECTION, SOLUTION INTRAVENOUS; SUBCUTANEOUS ONCE
Status: COMPLETED | OUTPATIENT
Start: 2024-02-19 | End: 2024-02-19

## 2024-02-19 RX ORDER — ONDANSETRON 2 MG/ML
INJECTION INTRAMUSCULAR; INTRAVENOUS AS NEEDED
Status: DISCONTINUED | OUTPATIENT
Start: 2024-02-19 | End: 2024-02-19 | Stop reason: SURG

## 2024-02-19 RX ORDER — BUPIVACAINE HYDROCHLORIDE 2.5 MG/ML
INJECTION, SOLUTION EPIDURAL; INFILTRATION; INTRACAUDAL AS NEEDED
Status: DISCONTINUED | OUTPATIENT
Start: 2024-02-19 | End: 2024-02-19 | Stop reason: HOSPADM

## 2024-02-19 RX ORDER — METRONIDAZOLE 500 MG/100ML
500 INJECTION, SOLUTION INTRAVENOUS ONCE
Status: COMPLETED | OUTPATIENT
Start: 2024-02-19 | End: 2024-02-19

## 2024-02-19 RX ORDER — NALOXONE HYDROCHLORIDE 0.4 MG/ML
0.08 INJECTION, SOLUTION INTRAMUSCULAR; INTRAVENOUS; SUBCUTANEOUS AS NEEDED
Status: DISCONTINUED | OUTPATIENT
Start: 2024-02-19 | End: 2024-02-19

## 2024-02-19 RX ORDER — HYDROMORPHONE HYDROCHLORIDE 1 MG/ML
0.2 INJECTION, SOLUTION INTRAMUSCULAR; INTRAVENOUS; SUBCUTANEOUS EVERY 5 MIN PRN
Status: DISCONTINUED | OUTPATIENT
Start: 2024-02-19 | End: 2024-02-19

## 2024-02-19 RX ORDER — MIDAZOLAM HYDROCHLORIDE 1 MG/ML
1 INJECTION INTRAMUSCULAR; INTRAVENOUS EVERY 5 MIN PRN
Status: DISCONTINUED | OUTPATIENT
Start: 2024-02-19 | End: 2024-02-19

## 2024-02-19 RX ORDER — PROCHLORPERAZINE EDISYLATE 5 MG/ML
5 INJECTION INTRAMUSCULAR; INTRAVENOUS EVERY 8 HOURS PRN
Status: DISCONTINUED | OUTPATIENT
Start: 2024-02-19 | End: 2024-02-19

## 2024-02-19 RX ORDER — HYDROMORPHONE HYDROCHLORIDE 1 MG/ML
0.4 INJECTION, SOLUTION INTRAMUSCULAR; INTRAVENOUS; SUBCUTANEOUS EVERY 5 MIN PRN
Status: DISCONTINUED | OUTPATIENT
Start: 2024-02-19 | End: 2024-02-19

## 2024-02-19 RX ORDER — SODIUM CHLORIDE, SODIUM LACTATE, POTASSIUM CHLORIDE, CALCIUM CHLORIDE 600; 310; 30; 20 MG/100ML; MG/100ML; MG/100ML; MG/100ML
INJECTION, SOLUTION INTRAVENOUS CONTINUOUS
Status: DISCONTINUED | OUTPATIENT
Start: 2024-02-19 | End: 2024-02-19

## 2024-02-19 RX ORDER — OXYCODONE HYDROCHLORIDE 5 MG/1
5 TABLET ORAL EVERY 6 HOURS PRN
Qty: 25 TABLET | Refills: 0 | Status: SHIPPED | OUTPATIENT
Start: 2024-02-19

## 2024-02-19 RX ORDER — MIDAZOLAM HYDROCHLORIDE 1 MG/ML
INJECTION INTRAMUSCULAR; INTRAVENOUS AS NEEDED
Status: DISCONTINUED | OUTPATIENT
Start: 2024-02-19 | End: 2024-02-19 | Stop reason: SURG

## 2024-02-19 RX ORDER — ACETAMINOPHEN 500 MG
1000 TABLET ORAL ONCE
Status: DISCONTINUED | OUTPATIENT
Start: 2024-02-19 | End: 2024-02-19 | Stop reason: HOSPADM

## 2024-02-19 RX ORDER — LIDOCAINE HYDROCHLORIDE AND EPINEPHRINE 10; 10 MG/ML; UG/ML
INJECTION, SOLUTION INFILTRATION; PERINEURAL AS NEEDED
Status: DISCONTINUED | OUTPATIENT
Start: 2024-02-19 | End: 2024-02-19 | Stop reason: HOSPADM

## 2024-02-19 RX ORDER — HYDROMORPHONE HYDROCHLORIDE 1 MG/ML
0.6 INJECTION, SOLUTION INTRAMUSCULAR; INTRAVENOUS; SUBCUTANEOUS EVERY 5 MIN PRN
Status: DISCONTINUED | OUTPATIENT
Start: 2024-02-19 | End: 2024-02-19

## 2024-02-19 RX ORDER — HYDROCODONE BITARTRATE AND ACETAMINOPHEN 5; 325 MG/1; MG/1
1 TABLET ORAL ONCE AS NEEDED
Status: DISCONTINUED | OUTPATIENT
Start: 2024-02-19 | End: 2024-02-19

## 2024-02-19 RX ORDER — ACETAMINOPHEN 500 MG
1000 TABLET ORAL ONCE AS NEEDED
Status: DISCONTINUED | OUTPATIENT
Start: 2024-02-19 | End: 2024-02-19

## 2024-02-19 RX ORDER — SCOLOPAMINE TRANSDERMAL SYSTEM 1 MG/1
1 PATCH, EXTENDED RELEASE TRANSDERMAL ONCE
Status: DISCONTINUED | OUTPATIENT
Start: 2024-02-19 | End: 2024-02-19

## 2024-02-19 RX ORDER — CEFAZOLIN SODIUM/WATER 2 G/20 ML
2 SYRINGE (ML) INTRAVENOUS ONCE
Status: COMPLETED | OUTPATIENT
Start: 2024-02-19 | End: 2024-02-19

## 2024-02-19 RX ORDER — HYDROCODONE BITARTRATE AND ACETAMINOPHEN 5; 325 MG/1; MG/1
2 TABLET ORAL ONCE AS NEEDED
Status: DISCONTINUED | OUTPATIENT
Start: 2024-02-19 | End: 2024-02-19

## 2024-02-19 RX ORDER — ONDANSETRON 2 MG/ML
4 INJECTION INTRAMUSCULAR; INTRAVENOUS EVERY 6 HOURS PRN
Status: DISCONTINUED | OUTPATIENT
Start: 2024-02-19 | End: 2024-02-19

## 2024-02-19 RX ORDER — MEPERIDINE HYDROCHLORIDE 25 MG/ML
25 INJECTION INTRAMUSCULAR; INTRAVENOUS; SUBCUTANEOUS
Status: DISCONTINUED | OUTPATIENT
Start: 2024-02-19 | End: 2024-02-19

## 2024-02-19 RX ORDER — KETOROLAC TROMETHAMINE 30 MG/ML
INJECTION, SOLUTION INTRAMUSCULAR; INTRAVENOUS AS NEEDED
Status: DISCONTINUED | OUTPATIENT
Start: 2024-02-19 | End: 2024-02-19 | Stop reason: SURG

## 2024-02-19 RX ORDER — METOCLOPRAMIDE HYDROCHLORIDE 5 MG/ML
INJECTION INTRAMUSCULAR; INTRAVENOUS AS NEEDED
Status: DISCONTINUED | OUTPATIENT
Start: 2024-02-19 | End: 2024-02-19 | Stop reason: SURG

## 2024-02-19 RX ADMIN — SODIUM CHLORIDE, SODIUM LACTATE, POTASSIUM CHLORIDE, CALCIUM CHLORIDE: 600; 310; 30; 20 INJECTION, SOLUTION INTRAVENOUS at 08:35:00

## 2024-02-19 RX ADMIN — KETOROLAC TROMETHAMINE 30 MG: 30 INJECTION, SOLUTION INTRAMUSCULAR; INTRAVENOUS at 09:03:00

## 2024-02-19 RX ADMIN — MIDAZOLAM HYDROCHLORIDE 2 MG: 1 INJECTION INTRAMUSCULAR; INTRAVENOUS at 07:28:00

## 2024-02-19 RX ADMIN — METOCLOPRAMIDE HYDROCHLORIDE 10 MG: 5 INJECTION INTRAMUSCULAR; INTRAVENOUS at 08:30:00

## 2024-02-19 RX ADMIN — CEFAZOLIN SODIUM/WATER 2 G: 2 G/20 ML SYRINGE (ML) INTRAVENOUS at 07:31:00

## 2024-02-19 RX ADMIN — METRONIDAZOLE 500 MG: 500 INJECTION, SOLUTION INTRAVENOUS at 07:28:00

## 2024-02-19 RX ADMIN — ONDANSETRON 4 MG: 2 INJECTION INTRAMUSCULAR; INTRAVENOUS at 09:03:00

## 2024-02-19 NOTE — ANESTHESIA POSTPROCEDURE EVALUATION
[facility]    Luis Danieldanyell Headley Patient Status:  Hospital Outpatient Surgery   Age/Gender 43 year old male MRN [id number]   Location [facility] POST ANESTHESIA CARE UNIT Attending Vanessa Gomez MD   Hosp Day # 0 PCP Luís Pitts MD       Anesthesia Post-op Note    EXCISIONAL HEMORRHOIDECTOMY and rubber band ligation of one hemorrhoid    Procedure Summary       Date: 02/19/24 Room / Location:  MAIN OR 04 / [facility] MAIN OR    Anesthesia Start: 0728 Anesthesia Stop: 0902    Procedure: EXCISIONAL HEMORRHOIDECTOMY and rubber band ligation of one hemorrhoid (Anus) Diagnosis:       Hemorrhoids, unspecified hemorrhoid type      (Hemorrhoids, unspecified hemorrhoid type [K64.9])    Surgeons: Vanessa Gomez MD Anesthesiologist: David Mccarty MD    Anesthesia Type: general ASA Status: 2            Anesthesia Type: general    Vitals Value Taken Time   /75 02/19/24 0902   Temp 98 02/19/24 0902   Pulse 75 02/19/24 0902   Resp 16 02/19/24 0902   SpO2 96 02/19/24 0902       Patient Location: PACU    Anesthesia Type: general    Airway Patency: extubated    Postop Pain Control: adequate    Mental Status: mildly sedated but able to meaningfully participate in the post-anesthesia evaluation    Nausea/Vomiting: none    Cardiopulmonary/Hydration status: stable euvolemic    Complications: no apparent anesthesia related complications    Postop vital signs: stable    Dental Exam: Unchanged from Preop    Patient to be discharged from PACU when criteria met.

## 2024-02-19 NOTE — OPERATIVE REPORT
[facility]  Operative Report    Luis Daniel Headley Location: OR   [facility] [id number] MRN [id number]   Admission Date 2/19/2024 Operation Date 2/19/2024   Attending Physician Vanessa Gomez MD Operating Physician Vanessa Gomez MD       Patient Name: Luis Daniel Headley    Preoperative Diagnosis: Hemorrhoids, unspecified hemorrhoid type [K64.9]    Postoperative Diagnosis: Same as pre-op diagnosis    Surgeon(s) and Role:     * Vanessa Gomez MD - Primary    Anesthesia: General    Anesthesiologist: Anesthesiologist.: David Mccarty MD    Procedures: Anal exam under anesthesia. Excisional hemorrhoidectomy of the left lateral and the right posterior columns and rubber band ligation of the right anterior hemorrhoidal column     Implants: None    Specimen: Left lateral hemorrhoidal column and right posterior hemorrhoidal column    Drains: None    Estimated Blood Loss: 25 mL     Complications: None    Condition: Good    Indications for Surgery:   Luis Daniel Headley is a 43 year old who presents with hematochezia. He underwent a colonoscopy that showed internal hemorrhoids. He has been anemic due to bleeding from his hemorrhoids. He was started on dietary modifications and denies constipation at this time. He underwent banding multiple times with recurrence of symptoms. He is here for excisional hemorrhoidectomy     Surgical Findings:   Large right posterior internal and external hemorrhoid  Medium sized left lateral internal hemorrhoid with a small external component  Small right anterior internal hemorrhoid with no external hemorrhoid component     Description of Procedure:   The patient was transported to the operating room and general endotracheal anesthesia was administered. The patient was then placed in a prone position on the operating room table. The hips were flexed in the jackknife position, the buttocks were taped apart, and the perineum was prepped with Betadine paint. Sterile drapes were placed with  wide exposure of the perineum. Pre-operative antibiotics were given. A time-out was performed.    The anus was gently dilated with serially larger Hill-Page retractors. Anoscopy was performed to reveal the above listed findings. Lidocaine 0.25% with epinephrine was injected into all hemorrhoidal complexes, the perianal skin, and into the deep sphincter complex. The right posterior and left lateral hemorrhoids were excised in the following fashion. The hemorrhoid tissue was grasped and elevated. A V-shaped incision was made in the perianal skin. The hemorrhoidal tissues and hemorrhoidal complexes were dissected off of the sphincter complex with sharp scissors dissection. Great care was taken to avoid the sphincter musculature. The apex of the hemorrhoid column was clamped and excised. A 2-0 Vicryl suture was placed at the apex of the hemorrhoidal complex. Additional ligation with the 2-0 vicryl was performed. Hemostasis was achieved with electrocautery. The hemorrhoidectomy site was irrigated. The resultant incision was closed with a running 2-0 Vicryl suture line in a simple continuous locking fashion. Great care was taken to reapproximate the anoderm to cutaneous junction and the anoderm mucosal junction. The anal skin incision was closed using 3-0 chromic suture in a simple running fashion. Reinforcing sutures of 2-0 Vicryl were used where necessary to provide further hemostasis and to strengthen the suture line. The right anterior hemorrhoid was significantly smaller and had no signs of bleeding. There was no external hemorrhoid component in that position. I elected to perform hemorrhoidal banding in this column.      The perineum was cleansed and dried and dressed with 4x4's and paper tape. The patient was flipped back onto the hospital bed and anesthesia was terminated. The patient was transported to the recovery area in good condition without apparent intraoperative complication. All sponge, needle, and  instrument counts were correct at the end of the case.    Vanessa Gomez MD   2/19/2024  8:52 AM

## 2024-02-19 NOTE — DISCHARGE INSTRUCTIONS
Home Care Instructions  Hemorrhoidectomy  Dr. Vanessa Gomez     WHAT TO EXPECT  -Complete recovery after hemorrhoid surgery can take up to 6 or 8 weeks or more.     -During the first week you may experience intense anal pain. By the second week your pain will be significantly improved.    -A small amount of bleeding is common following hemorrhoid surgery. A sanitary napkin or gauze may be worn between buttocks over the anal opening to catch any drainage. If there is prolonged or profuse bleeding with passage of clots, call the office immediately.     -To avoid constipation take Metamucil or other fiber supplement product (Benefiber, Citrucel, Konsyl, etc) one teaspoon daily. Take a stool softener such as miralax once daily. If you have not had a bowel movement in 48 hours following surgery, Start taking Miralax every 4 - 6 hours until you have a bowel movement. If another 24 hours passes without bowel movement, drink one bottle of magnesium citrate. Following the first bowel movement, you should have a bowel movement at least every other day. If 2 days pass without a bowel movement, take an ounce of milk of magnesia. Repeat in 6 hours if no result.     -Multiple loose stools can irritate the anus and your incisions. If you begin having loose stools, stop taking any stool softeners (miralax, magnesium citrate, milk of magnesia). You can continue to take the fiber supplements. The goal should be 1 - 3 formed bowel movements daily.    -Difficulty urinating after hemorrhoidectomy is common. Getting the pain under control and relaxing the pelvic floor muscles usually allows for the urine to pass. While soaking in a warm bath, attempt to relax the bladder and urinate into the water. If you are unable to urinate in the first eight hours after your surgery, notify the doctor’s office. After hours, go to the nearest emergency room or urgent care center. A bladder catheter will be placed and remain in place for 2 days, you  may call the office to have the catheter removed.     MEDICATIONS  -You can take Tylenol 1000mg (2 Extra Strength Tylenol) every 6 hours for pain. For the first 48 hours it is best to take the Tylenol every 6 hours even if you do not feel much pain.     -For moderate to severe post-operative pain control you will be prescribed Oxycodone. Take one pill every six hours as needed for pain. If you do not feel that narcotics are necessary you shouldn’t take them. If the pain is severe then you may take two pills every six hours, taking care not to exceed 8 pills in a 24 hour period.     -You may also add Ibuprofen 800mg every 8 hours or Naproxen 400 - 500 mg every 12 hours. May start AFTER 3PM if needed.     -Do not take Aspirin for seven days after surgery. Ask your surgeon about restarting any other blood thinners.    -All other home medications may be resumed as scheduled.     WOUND CARE  -Your incisions will be closed with stitches that will dissolve over time. Occasionally these may need to be trimmed in the office. It is possible that the incisions will separate and expose the underlying tissue. This may prolong healing but overall is not an emergency as long as there is not excessive bleeding.     -Take warm water baths, either in a bathtub or a Sitz basin, 3-4 times daily for 15 - 20 minutes, and after each bowel movement.  This will help relax the pelvic floor muscles, soothe your pain,  cleanse the anus, and help you urinate.     -You should also ideally take a quick bath or shower after each bowel movement to keep the anus clean. You may also use a Richelle bottle to irrigate the anus. If you must wipe after bowel movements, be very gentle and moisten the toilet tissue with water.    -Avoid applying any ointments or lubricants to the anus. Do not use baby wipe or other medicated wipes. The chemicals in these items can irritate the anus and surrounding skin.     ACTIVITY   -Avoid exercise or strenuous activity for  1-2 weeks after your procedure. Walking, going up and down stairs, and sitting gently are ok to do.     -Do not sit on a hemorrhoid donut or other circular pad with a hole in the middle, these cause increased pressure in the hemorrhoids and can increase pain and bleeding and incision tearing. Instead, sit on a soft pad or pillow.    DIET   -Eat a regular diet including plenty of fresh fruit and vegetables. Drink 6-8 glasses of water a day. Avoid spicy foods.      APPOINTMENT  Please call our office as soon as possible for an appointment at [phone]. Please call our office immediately for fever greater than 100.5, extensive bleeding, inability to urinate.  For life threatening emergencies such as severe chest pain, shortness of breath, loss of conciousness call [phone]. For non-emergent care please call our office after 8:30 a.m. Monday through Friday. The number above directs to the answering service after hours to reach the on-call physician.

## 2024-02-19 NOTE — H&P
New Patient Visit Note       Active Problems      No diagnosis found.      Chief Complaint   No chief complaint on file.      History of Present Illness   43-year-old male who is referred to me for evaluation of bleeding internal hemorrhoids.  He reports he been dealing with hemorrhoids for many years.  Last year he had banding and infrared treatment of the hemorrhoids without any improvement he then became a patient of Dr. Hernandez who did a colonoscopy with banding in January.  His colonoscopy was completely normal otherwise.  He also underwent an EGD that showed a 2 cm hiatal hernia and mild gastritis.  He has had anemia with his hemoglobin levels dropping to 7 and requiring blood transfusions and iron transfusions.  He had repeat banding with Dr. Hernandez in June.  He reports improvement of symptoms after every banding session that will last for a few months and then recur.  He has had severe bleeding with every bowel movement that fills the toilet bowl over the past 2 weeks.  He also reports pain with defecation.    He has not had any anorectal surgery in the past.        Allergies  Luis Daniel has No Known Allergies.    Past Medical / Surgical / Social / Family History    The past medical and past surgical history have been reviewed by me today.    Past Medical History:   Diagnosis Date    Abdominal pain 01/13/2023    First day of pain    Acute, but ill-defined, cerebrovascular disease 01/02/2021    We really do not know when    Anemia 01/01/2022    Back pain 01/01/1996    Accident    Black stools 01/01/2014    Was with previous GI     Blood disorder     anemic- seeing Hematologist for first time 1/24/23    Blood in the stool 01/01/2014    Was with previous GI dr    Decorative tattoo 01/01/1994    Have several    Dizziness 01/01/2022    Been weak dizy lose blood almost daily    Excessive bleeding 01/01/2014    When going poop    Glaucoma 07/15/2016    Surgery    Headache disorder 07/15/2016    Seeing neuro      Hemorrhoids 01/01/2014    First noticed issues    History of blood transfusion     Jaundice 12/24/2022    Went to ER    Lab test positive for detection of COVID-19 virus     11/2021 NO HOSPITALIZATION- CHILLS,FEVER, loss taste ,smell, body aches    Migraines     Mini stroke     Nausea 01/13/2023    PONV (postoperative nausea and vomiting)     Weight loss 12/24/2022     Past Surgical History:   Procedure Laterality Date    COLONOSCOPY  01/31/2022    Last one    COLONOSCOPY N/A 01/25/2023    Procedure: COLONOSCOPY with Hemorrhoid BANDING X3, ESOPHAGOGASTRODUODENOSCOPY (EGD) WITH BIOPSIES;  Surgeon: Chris Hernandez DO;  Location:  ENDOSCOPY    COLONOSCOPY  06/2023    flex sig w hemorrhoid banding       The family history and social history have been reviewed by me today.    History reviewed. No pertinent family history.  Social History     Socioeconomic History    Marital status:    Tobacco Use    Smoking status: Some Days     Types: Cigarettes    Smokeless tobacco: Never   Vaping Use    Vaping Use: Never used   Substance and Sexual Activity    Alcohol use: Yes     Alcohol/week: 3.0 standard drinks of alcohol     Types: 3 Cans of beer per week     Comment: occasionally    Drug use: Yes     Frequency: 35.0 times per week     Types: Cannabis     Comment: Daily use/ flower   Other Topics Concern    Caffeine Concern Yes     Comment: 1 cup coffee daily    Exercise Yes     Comment: at least 1x per week, active at work      No current outpatient medications on file.      Review of Systems  The Review of Systems has been reviewed by me during today.  Review of Systems   Constitutional:  Negative for chills, diaphoresis, fatigue and fever.   HENT:  Negative for ear discharge, ear pain and sore throat.    Eyes:  Negative for pain and discharge.   Respiratory:  Negative for cough, chest tightness and shortness of breath.    Cardiovascular:  Negative for chest pain, palpitations and leg swelling.   Gastrointestinal:   Positive for anal bleeding, blood in stool and rectal pain. Negative for abdominal distention, abdominal pain, constipation, diarrhea, nausea and vomiting.   Genitourinary:  Negative for dysuria, frequency, hematuria and urgency.   Skin:  Negative for color change, pallor and rash.   Neurological:  Negative for weakness, light-headedness, numbness and headaches.   Hematological:  Negative for adenopathy. Does not bruise/bleed easily.   Psychiatric/Behavioral:  Negative for agitation and confusion.        Physical Findings   Physical Exam  Constitutional:       Appearance: Normal appearance.   HENT:      Head: Normocephalic and atraumatic.   Cardiovascular:      Pulses: Normal pulses.   Pulmonary:      Effort: Pulmonary effort is normal.   Genitourinary:     Comments: The perineum and perianal skin were examined.   There was enlarged external hemorrhoids.      Digital rectal exam was performed. There was  good resting anal sphincter tone.      Anoscopy was performed. The anal canal and anorectal ring were examined circumferentially.   There was not anal fissure.  There was not anal fistula internal opening.   There was internal hemorrhoid enlargement grade 3.   There was blood or mucus seen.    There is grade 3 hemorrhoidal tissue in all 3 columns with an external component.     Skin:     General: Skin is warm.      Capillary Refill: Capillary refill takes less than 2 seconds.   Neurological:      Mental Status: He is alert and oriented to person, place, and time. Mental status is at baseline.             Assessment   No diagnosis found.      Amber Headley is a 43 year old male referred by Dr. Saldaña for evaluation of internal/external hemorrhoids.  On exam he has internal/external hemorrhoids in all columns  He has symptoms of severe bleeding causing anemia requiring transfusions and pain from his external hemorrhoids  He has had multiple rounds of banding which has brief improvement in symptoms but  recurrence after  He is consistently taking fiber supplementation increase his fiber in his diet and adheres to good bowel habit practices.  I discussed with him excisional hemorrhoidectomy and explained the risks and benefits outcomes and recovery  will proceed with surgery today. No changes since interview in the office.   All questions answered and informed consent obtained     No orders of the defined types were placed in this encounter.      Imaging & Referrals   VITAL SIGNS  NURSING COMMUNICATION  PLACE PIV  ACTIVITY AS TOLERATED  HEIGHT AND WEIGHT  INITIATE ADULT PREOP PROPHYLACTIC ABX PROTOCOL  VERIFY INFORMED CONSENT  NPO  VITAL SIGNS - NOTIFY PHYSICIAN    Follow Up  No follow-ups on file.    Vanessa Gomez MD

## 2024-02-20 ENCOUNTER — TELEPHONE (OUTPATIENT)
Dept: HEMATOLOGY/ONCOLOGY | Facility: HOSPITAL | Age: 44
End: 2024-02-20

## 2024-02-22 ENCOUNTER — TELEPHONE (OUTPATIENT)
Dept: HEMATOLOGY/ONCOLOGY | Facility: HOSPITAL | Age: 44
End: 2024-02-22

## 2024-02-26 ENCOUNTER — TELEPHONE (OUTPATIENT)
Dept: HEMATOLOGY/ONCOLOGY | Facility: HOSPITAL | Age: 44
End: 2024-02-26

## 2024-02-28 ENCOUNTER — TELEPHONE (OUTPATIENT)
Facility: LOCATION | Age: 44
End: 2024-02-28

## 2024-02-28 NOTE — TELEPHONE ENCOUNTER
Attempted to call the patient back to discuss symptoms since surgery.    Discussed multi-modal pain control, sitz baths and bowel regimen.     Advised the patient to call the office back for further discussion.     Mariam Wing PA-C

## 2024-02-28 NOTE — TELEPHONE ENCOUNTER
Patient's wife called patient has a fever and chills. Wife would like a call back advising what to do.   Please advise     Thank you         P: [phone]

## 2024-02-28 NOTE — TELEPHONE ENCOUNTER
Spoke with spouse Neil. Patient is post op excisional hemorrhoidectomy on 2/19/2024 with Dr. Gomez. C/o body aches, cold sweat chills and body warmth. Patient not able to take temperature. Denies rectal pain or drainage, nausea or vomiting. Pt is having bowel movements. Denies any rectal symptoms. Notified Mariam Wing PA-C. Advised patient to go to urgent care or immediate care for evaluation. Patient may also take Tylenol for fever. Spouse voiced understanding.     Future Appointments   Date Time Provider Department Center   3/4/2024 10:30 AM [facility]JPUT [id number]   4/2/2024  4:15 PM Vanessa Gomez MD [id number] [id number]

## 2024-03-04 ENCOUNTER — OFFICE VISIT (OUTPATIENT)
Facility: LOCATION | Age: 44
End: 2024-03-04
Payer: COMMERCIAL

## 2024-03-04 VITALS — TEMPERATURE: 97 F | HEART RATE: 60 BPM

## 2024-03-04 DIAGNOSIS — Z98.890 POSTOPERATIVE STATE: Primary | ICD-10-CM

## 2024-03-04 NOTE — PROGRESS NOTES
Post Operative Visit Note       Active Problems  1. Postoperative state         Chief Complaint   Chief Complaint   Patient presents with    Post-Op     PO 2/19 EXCISIONAL HEMORRHOIDECTOMY and rubber band ligation of one hemorrhoid W/MARY- pt reports mold pain, denies bleeding           History of Present Illness   The patient presents for continued care and evaluation following a an excisional hemorrhoidectomy in 2 locations with Dr. Gomez on February 19, 2024.     Overall, the patient is doing very well postoperatively.  He states he continues to have some pain, but the pain is generally tolerable.  He has some worsening pain with passing stools.  He denies diarrhea or constipation.  He denies bright red blood per rectum.    Last week, the patient experienced 2 days of subjective fevers and chills.  This is since resolved.  He denies associated diarrhea, constipation, nausea, vomiting or upper respiratory tract symptoms.    The patient is tolerating a diet.    The patient works as a  which requires a significant amount of heavy lifting.  He will require a return to work note.            Allergies  Luis Daniel has No Known Allergies.    Past Medical / Surgical / Social / Family History    The past medical and past surgical history have been reviewed by me today.     Past Medical History:   Diagnosis Date    Abdominal pain 01/13/2023    First day of pain    Acute, but ill-defined, cerebrovascular disease 01/02/2021    We really do not know when    Anemia 01/01/2022    Back pain 01/01/1996    Accident    Black stools 01/01/2014    Was with previous GI     Blood disorder     anemic- seeing Hematologist for first time 1/24/23    Blood in the stool 01/01/2014    Was with previous GI dr    Decorative tattoo 01/01/1994    Have several    Dizziness 01/01/2022    Been weak dizy lose blood almost daily    Excessive bleeding 01/01/2014    When going poop    Glaucoma 07/15/2016    Surgery    Headache disorder  07/15/2016    Seeing neuro     Hemorrhoids 01/01/2014    First noticed issues    History of blood transfusion     Jaundice 12/24/2022    Went to ER    Lab test positive for detection of COVID-19 virus     11/2021 NO HOSPITALIZATION- CHILLS,FEVER, loss taste ,smell, body aches    Migraines     Mini stroke     Nausea 01/13/2023    PONV (postoperative nausea and vomiting)     Weight loss 12/24/2022     Past Surgical History:   Procedure Laterality Date    COLONOSCOPY  01/31/2022    Last one    COLONOSCOPY N/A 01/25/2023    Procedure: COLONOSCOPY with Hemorrhoid BANDING X3, ESOPHAGOGASTRODUODENOSCOPY (EGD) WITH BIOPSIES;  Surgeon: Chris Hernandez DO;  Location:  ENDOSCOPY    COLONOSCOPY  06/2023    flex sig w hemorrhoid banding       The family history and social history have been reviewed by me today.    History reviewed. No pertinent family history.  Social History     Socioeconomic History    Marital status:    Tobacco Use    Smoking status: Some Days     Types: Cigarettes    Smokeless tobacco: Never   Vaping Use    Vaping Use: Never used   Substance and Sexual Activity    Alcohol use: Yes     Alcohol/week: 3.0 standard drinks of alcohol     Types: 3 Cans of beer per week     Comment: occasionally    Drug use: Yes     Frequency: 35.0 times per week     Types: Cannabis     Comment: Daily use/ flower   Other Topics Concern    Caffeine Concern Yes     Comment: 1 cup coffee daily    Exercise Yes     Comment: at least 1x per week, active at work        Current Outpatient Medications:     oxyCODONE 5 MG Oral Tab, Take 1 tablet (5 mg total) by mouth every 6 (six) hours as needed., Disp: 25 tablet, Rfl: 0      Review of Systems  The Review of Systems has been reviewed by me during today.  Review of Systems    Physical Findings   Pulse 60   Temp 97.3 °F (36.3 °C) (Temporal)   Physical Exam  Nursing note reviewed. Exam conducted with a chaperone present.   Constitutional:       Appearance: Normal appearance. He  is normal weight.   HENT:      Head: Normocephalic and atraumatic.      Right Ear: External ear normal.      Left Ear: External ear normal.      Nose: Nose normal.   Eyes:      Conjunctiva/sclera: Conjunctivae normal.   Genitourinary:     Comments: Clinical exam of the rectum was limited to external exam only as the patient did not want to undergo a digital rectal exam at the time of today's visit.  The patient has a right posterior lateral hemorrhoid tag.  I removed 2 sutures at the time of today's visit.  There are no fissures, fistulas or abscesses.  There is no surrounding induration or cellulitis.  Neurological:      Mental Status: He is alert.   Psychiatric:         Mood and Affect: Mood normal.         Behavior: Behavior normal.             Assessment   1. Postoperative state          Plan     The patient is doing well status post excisional hemorrhoidectomy on February 19, 2024.    I took the opportunity at this appointment to discuss the pathology with the patient which revealed tissue consistent with hemorrhoids..    I discussed with the patient that they should refrain from any bending, pushing, pulling, twisting, or lifting of a force greater than 20 pounds for 4 weeks post-op. Activity restrictions were reviewed.     The patient should continue a high fiber diet.     The patient may take ibuprofen and Tylenol as needed for pain management.  They may also utilize heating pads or ice packs for comfort measures.    All of the patient's questions were answered.  The patient verbalized understanding and agreement with the plan of care.    He should follow up with Dr. Gomez or a PA in approximately 3 weeks for a digital rectal exam.          No orders of the defined types were placed in this encounter.      Imaging & Referrals   None    Follow Up  No follow-ups on file.    Juliette Price PA-C

## 2024-03-12 ENCOUNTER — OFFICE VISIT (OUTPATIENT)
Dept: HEMATOLOGY/ONCOLOGY | Facility: HOSPITAL | Age: 44
End: 2024-03-12
Attending: INTERNAL MEDICINE
Payer: COMMERCIAL

## 2024-03-12 VITALS
DIASTOLIC BLOOD PRESSURE: 66 MMHG | HEART RATE: 64 BPM | SYSTOLIC BLOOD PRESSURE: 106 MMHG | OXYGEN SATURATION: 99 % | TEMPERATURE: 98 F | RESPIRATION RATE: 16 BRPM

## 2024-03-12 DIAGNOSIS — D50.0 IRON DEFICIENCY ANEMIA DUE TO CHRONIC BLOOD LOSS: Primary | ICD-10-CM

## 2024-03-12 PROCEDURE — 96365 THER/PROPH/DIAG IV INF INIT: CPT

## 2024-03-19 ENCOUNTER — OFFICE VISIT (OUTPATIENT)
Dept: HEMATOLOGY/ONCOLOGY | Facility: HOSPITAL | Age: 44
End: 2024-03-19
Attending: INTERNAL MEDICINE
Payer: COMMERCIAL

## 2024-03-19 VITALS
HEART RATE: 61 BPM | DIASTOLIC BLOOD PRESSURE: 70 MMHG | RESPIRATION RATE: 16 BRPM | TEMPERATURE: 98 F | SYSTOLIC BLOOD PRESSURE: 104 MMHG | OXYGEN SATURATION: 98 %

## 2024-03-19 DIAGNOSIS — D50.0 IRON DEFICIENCY ANEMIA DUE TO CHRONIC BLOOD LOSS: Primary | ICD-10-CM

## 2024-03-19 PROCEDURE — 96365 THER/PROPH/DIAG IV INF INIT: CPT

## 2024-03-19 NOTE — PROGRESS NOTES
Pt here for venofer . Pt denies any issues or concerns.      Ordering MD: Dr. Humphreys  Order Exp: one more dose     Pt tolerated infusion without difficulty or complaint. Reviewed next apt date/time: yes      Education Record  Learner:  Patient and Spouse  Disease / Diagnosis: iron deficiency anemia  Barriers / Limitations:  None  Method:  Brief focused and Discussion  General Topics:  Medication and Plan of care reviewed  Outcome:  Shows understanding

## 2024-03-22 ENCOUNTER — TELEPHONE (OUTPATIENT)
Facility: LOCATION | Age: 44
End: 2024-03-22

## 2024-03-22 NOTE — TELEPHONE ENCOUNTER
Disability Questionnaire ([facility]) received at Forms Department on 3/18/2024. Logged for processing.     TOM for [facility] already on file, in a Med Rec, from 10/19/2023.

## 2024-03-26 ENCOUNTER — APPOINTMENT (OUTPATIENT)
Dept: HEMATOLOGY/ONCOLOGY | Facility: HOSPITAL | Age: 44
End: 2024-03-26
Attending: INTERNAL MEDICINE
Payer: COMMERCIAL

## 2024-03-27 NOTE — TELEPHONE ENCOUNTER
Please try to avoid signing forms in the corner as it is not visible when printing and forms are not accepted this way. Thank you!     Dr. Gomez,      *The ACKNOWLEDGE button has been moved to the top right ribbon*    Please sign off on form if you agree to: DISAB - RTW  (place your signature on the first page only)    -From your Inbasket, Highlight the patient and click Chart   -Double click the 03/22/2024 Forms Completion telephone encounter  -Scroll down to the Media section   -Click the blue Hyperlink: RTW Dr. Vanessa Gomez 03/27/2024  -Click Acknowledge located in the top right ribbon/menu   -Drag the mouse into the blank space of the document and a + sign will appear. Left click to   electronically sign the document.     Thank you,  Paige ROBERSON

## 2024-03-28 NOTE — TELEPHONE ENCOUNTER
Disability Questionnaire has been signed, I printed out as clear as I could to get faxed to [facility], if problems arise I might have to just upload to pts mychart for them to just forward to requester themselves. The original forms was sent in picture formation and lost some of it's clarity during the completing process.     Faxed pending confirmation

## 2024-03-28 NOTE — TELEPHONE ENCOUNTER
After printing out to attempt a faxing of questionnaire it was even more distorted where the signature section was located. I have sent via [employer] w/ an explanation, if pt calls this can re-iterated to him.     Archived form.

## 2024-04-02 ENCOUNTER — OFFICE VISIT (OUTPATIENT)
Dept: HEMATOLOGY/ONCOLOGY | Facility: HOSPITAL | Age: 44
End: 2024-04-02
Attending: INTERNAL MEDICINE
Payer: COMMERCIAL

## 2024-04-02 VITALS
RESPIRATION RATE: 16 BRPM | OXYGEN SATURATION: 96 % | HEART RATE: 81 BPM | SYSTOLIC BLOOD PRESSURE: 119 MMHG | DIASTOLIC BLOOD PRESSURE: 79 MMHG | TEMPERATURE: 98 F

## 2024-04-02 DIAGNOSIS — D50.0 IRON DEFICIENCY ANEMIA DUE TO CHRONIC BLOOD LOSS: Primary | ICD-10-CM

## 2024-04-02 PROCEDURE — 96365 THER/PROPH/DIAG IV INF INIT: CPT

## 2024-04-02 NOTE — PROGRESS NOTES
Pt here for venofer . Pt denies any issues or concerns.      Ordering MD: Dr. Humphreys  Order Exp: last dose today     Pt tolerated infusion without difficulty or complaint. Reviewed next apt date/time: yes      Education Record  Learner:  Patient and Spouse  Disease / Diagnosis: iron deficiency anemia  Barriers / Limitations:  None  Method:  Brief focused and Discussion  General Topics:  Medication and Plan of care reviewed  Outcome:  Shows understanding

## 2024-06-19 ENCOUNTER — OFFICE VISIT (OUTPATIENT)
Dept: SURGERY | Facility: CLINIC | Age: 44
End: 2024-06-19

## 2024-06-19 DIAGNOSIS — N45.1 EPIDIDYMITIS: Primary | ICD-10-CM

## 2024-06-19 DIAGNOSIS — R82.90 URINE FINDING: ICD-10-CM

## 2024-06-19 LAB
APPEARANCE: CLEAR
BILIRUBIN: NEGATIVE
GLUCOSE (URINE DIPSTICK): NEGATIVE MG/DL
KETONES (URINE DIPSTICK): NEGATIVE MG/DL
LEUKOCYTES: NEGATIVE
MULTISTIX LOT#: NORMAL NUMERIC
NITRITE, URINE: NEGATIVE
OCCULT BLOOD: NEGATIVE
PH, URINE: 5.5 (ref 4.5–8)
PROTEIN (URINE DIPSTICK): NEGATIVE MG/DL
SPECIFIC GRAVITY: >=1.03 (ref 1–1.03)
URINE-COLOR: YELLOW
UROBILINOGEN,SEMI-QN: 0.2 MG/DL (ref 0–1.9)

## 2024-06-19 PROCEDURE — 99213 OFFICE O/P EST LOW 20 MIN: CPT | Performed by: PHYSICIAN ASSISTANT

## 2024-06-19 PROCEDURE — 81002 URINALYSIS NONAUTO W/O SCOPE: CPT | Performed by: PHYSICIAN ASSISTANT

## 2024-06-19 NOTE — PROGRESS NOTES
Subjective:   Luis Daniel Headley is a 44 year old male with hx of migraines, TIA, left frontal hemangioma, LBP with left sided sciatica, who presents for intermittent left scrotal and LLQ abdominal pain.     Patient seen 11/2022 by DR. Hernández for LLQ pain. Had ultrasound completed at that time that showed incidental scrotal mass and epididymal cyst. He was given reassurance on imaging findings and advised to take NSAIDS with follow up prn.     Patient notes that it improved for a few months. Has continued to be more intermittent pain without taking any NSAIDS. Has been more consistent over the last few weeks.     Patient denies any scrotal swelling. No scrotal injury or trauma. No significant voiding complaints. No dysuria, gross hematuria.     History/Other:    Chief Complaint Reviewed and Verified  Nursing Notes Reviewed and   Verified  Allergies Reviewed  Medications Reviewed         No current outpatient medications on file.       Review of Systems:  Pertinent items are noted in HPI.      Objective:   There were no vitals taken for this visit. Estimated body mass index is 28.55 kg/m² as calculated from the following:    Height as of 1/31/24: 5' 10\" (1.778 m).    Weight as of 2/19/24: 199 lb (90.3 kg).    GENERAL APPEARANCE: a well-developed, well-nourished, in no apparent distress  A&Ox3  ABDOMEN: soft, good BS's, no masses/HSM, no tenderness  CVA: no CVA tenderness  INGUINAL CANALS: no hernias  PENILE MEATUS: open and in normal location  PENIS normal  SCROTUM: normal  no varicocele  TESTES: normal anatomy  EPIDIDYMIS: normal anatomy epididymal cyst,  SKIN without lesion      Laboratory Data:  Lab Results   Component Value Date    WBC 6.6 02/09/2024    HGB 12.9 (L) 02/09/2024    .0 02/09/2024     Lab Results   Component Value Date     01/17/2023    K 3.6 01/17/2023     01/17/2023    CO2 28.0 01/17/2023    BUN 16 01/17/2023     (H) 01/17/2023    AST 22 01/17/2023    ALT 26 01/17/2023     TP 6.4 06/05/2023    ALB 4.12 06/05/2023    CA 8.6 01/17/2023    MG 2.1 08/14/2016       Urinalysis Results (last three years):  Recent Labs     11/30/22  1023 06/19/24  1518   SPECGRAVITY >=1.030 >=1.030   PHURINE 5.5 5.5   NITRITE Negative Negative       Urine Culture Results (last three years):  No results found for: \"URINECUL\"    Imaging  No results found    Assessment & Plan:   Epididymitis, chronic    We discussed that he likely has a component of epididymitis. We reviewed epididymitis treatment and prevention strategies and I provided and reviewed educational materials for this. I recommend he drink plenty of fluids and try prn NSAIDs, wear an athletic supporter and try to avoid activities which exacerbate pain such as prolonged sitting or heavy lifting, try hot baths/hot packs.   Consider PT in future.   Scrotal ultrasound ordered.       Khloe Mosher PA-C, 6/19/2024

## 2024-06-19 NOTE — PATIENT INSTRUCTIONS
Epididymitis and Testicular Pain  The epididymis is a small tube next to the testicle that stores sperm. Inflammation of the epididymis can cause pain and swelling in your scrotum. The condition may be acute (sudden onset) or chronic (persisting for a longer period of time or recurrent).   - Acute epididymitis is typically characterized by sudden onset pain, tenderness, swelling and redness.  - Chronic epididymitis is typically characterized by intermittent or long-term dull ache in one or both testicles but the pain can become severe at times.    Causes  - Acute epididymitis is often caused by an infection. In sexually active men, it is often caused by a sexually transmitted disease (STD) such as chlamydia or gonorrhea. In boys and in men over 40, it can be from bacteria from other parts of the urinary tract (not an STD infection). It may also be caused by trauma.  - Chronic epididymitis often may not be associated with an infectious process. Things that may irritate the testicles include sitting for long periods of time, squats or dead-lifts, motorcycles or biking, inflammatory disorders of the pelvis and other painful conditions that can affect the pelvis such as chronic low back pain. Constipation and other bowel-related issues can contribute to chronic testicular pain.    Symptoms may begin with pain in the lower belly (abdomen) or low back. The pain then spreads down into the scrotum. Usually only one side is affected. The testicle and scrotum swell and become very painful and red. You may have fever and a burning when passing urine. Sometimes you may have a discharge from the penis.  Treatment is typically with antibiotics, and anti-inflammatory and pain medicines. The condition should get better over the first few days of treatment. But it will take several weeks for all the swelling and discomfort to go away. If your healthcare provider suspects that an STD is the cause, your sexual partners may need to  be treated.    Home care  The following will help you care for yourself at home:  Support the scrotum. When lying down, place a rolled towel under the scrotum. When walking, use an athletic supporter or 2 pairs of jockey-style underwear.  Rest at home until the fever is gone and you are feeling better.  If your healthcare gives you an antibiotic, take it exactly as you are told. Take it until it is all gone.  Avoid sitting at a 90 degree angle for long periods of time. Standing or reclining is preferable. Avoid sitting on anything that shakes (tractors, lawn-mowers, long car rides) if possible. You may use a donut while sitting to avoid excessive pressure on the testicles while sitting.  To relieve pain, put ice packs on the inflamed area. You can make your own ice pack by putting ice cubes in a sealed plastic bag wrapped in a thin towel.  Soaking in a hot bath or using a heating pad may help alleviated discomfort in men suffering from chronic epididymitis. Would avoid excessive heat in the setting of acute epididymitis.  You may use over-the-counter medicines to control pain, unless another medicine was given. Ibuprofen is typically a very effective anti-inflammatory pain medication. You may take 200-400 mg twice daily with plenty of water as needed for discomfort. If you have chronic liver or kidney disease, talk with your healthcare provider before taking these medicines. Also talk with your provider if you've ever had a stomach ulcer or GI bleeding.  Make sure chronic pain issues are under good control, especially back pain issues as this is a significant risk factor for chronic testicular/pelvic pain. Talk to your primary care or back specialist if your pain is not under adequate control.  Constipation can make you strain. This makes the pain worse. Avoid constipation by eating natural laxatives such as prunes, fresh fruits, and whole-grain cereals. If necessary, use a mild over-the-counter laxative such as  colace for constipation. Mineral oil can be used to keep the stools soft.    When to seek medical advice  Call your healthcare provider right away if any of these occur:  Fever of 100.4ºF (38ºC), or as directed by your healthcare provider  Increasing pain or swelling of the testicle after starting treatment  Pressure or pain in your bladder that gets worse  Unable to pass urine for 8 hours    Date Last Reviewed: 7/1/2020  © 2768-0682 [employer]. [address] [place] [address]. All rights reserved. This information is not intended as a substitute for professional medical care. Always follow your healthcare professional's instructions.

## 2024-06-24 ENCOUNTER — HOSPITAL ENCOUNTER (OUTPATIENT)
Dept: MRI IMAGING | Facility: HOSPITAL | Age: 44
Discharge: HOME | End: 2024-06-24
Attending: Other

## 2024-06-24 DIAGNOSIS — M89.9 FRONTAL SKULL LESION: ICD-10-CM

## 2024-06-24 PROCEDURE — 70553 MRI BRAIN STEM W/O & W/DYE: CPT | Performed by: OTHER

## 2024-06-24 PROCEDURE — A9575 INJ GADOTERATE MEGLUMI 0.1ML: HCPCS | Performed by: OTHER

## 2024-06-24 RX ORDER — GADOTERATE MEGLUMINE 376.9 MG/ML
15 INJECTION INTRAVENOUS
Status: COMPLETED | OUTPATIENT
Start: 2024-06-24 | End: 2024-06-24

## 2024-06-24 RX ADMIN — GADOTERATE MEGLUMINE 27 ML: 376.9 INJECTION INTRAVENOUS at 18:44:00

## 2024-06-25 ENCOUNTER — TELEPHONE (OUTPATIENT)
Dept: NEUROLOGY | Facility: CLINIC | Age: 44
End: 2024-06-25

## 2024-06-25 ENCOUNTER — NURSE ONLY (OUTPATIENT)
Dept: HEMATOLOGY/ONCOLOGY | Facility: HOSPITAL | Age: 44
End: 2024-06-25
Attending: INTERNAL MEDICINE

## 2024-06-25 DIAGNOSIS — Z86.73 OLD CEREBELLAR INFARCT WITHOUT LATE EFFECT: Primary | ICD-10-CM

## 2024-06-25 DIAGNOSIS — D50.0 IRON DEFICIENCY ANEMIA DUE TO CHRONIC BLOOD LOSS: Primary | ICD-10-CM

## 2024-06-25 DIAGNOSIS — D50.0 IRON DEFICIENCY ANEMIA DUE TO CHRONIC BLOOD LOSS: ICD-10-CM

## 2024-06-25 LAB
BASOPHILS # BLD AUTO: 0.08 X10(3) UL (ref 0–0.2)
BASOPHILS NFR BLD AUTO: 1 %
DEPRECATED HBV CORE AB SER IA-ACNC: 46.7 NG/ML
EOSINOPHIL # BLD AUTO: 0.26 X10(3) UL (ref 0–0.7)
EOSINOPHIL NFR BLD AUTO: 3.1 %
ERYTHROCYTE [DISTWIDTH] IN BLOOD BY AUTOMATED COUNT: 13.6 %
HCT VFR BLD AUTO: 46 %
HGB BLD-MCNC: 15.8 G/DL
IMM GRANULOCYTES # BLD AUTO: 0.02 X10(3) UL (ref 0–1)
IMM GRANULOCYTES NFR BLD: 0.2 %
IRON SATN MFR SERPL: 18 %
IRON SERPL-MCNC: 65 UG/DL
LYMPHOCYTES # BLD AUTO: 3.22 X10(3) UL (ref 1–4)
LYMPHOCYTES NFR BLD AUTO: 38.9 %
MCH RBC QN AUTO: 29.1 PG (ref 26–34)
MCHC RBC AUTO-ENTMCNC: 34.3 G/DL (ref 31–37)
MCV RBC AUTO: 84.7 FL
MONOCYTES # BLD AUTO: 0.56 X10(3) UL (ref 0.1–1)
MONOCYTES NFR BLD AUTO: 6.8 %
NEUTROPHILS # BLD AUTO: 4.14 X10 (3) UL (ref 1.5–7.7)
NEUTROPHILS # BLD AUTO: 4.14 X10(3) UL (ref 1.5–7.7)
NEUTROPHILS NFR BLD AUTO: 50 %
PLATELET # BLD AUTO: 256 10(3)UL (ref 150–450)
RBC # BLD AUTO: 5.43 X10(6)UL
TIBC SERPL-MCNC: 368 UG/DL (ref 240–450)
TRANSFERRIN SERPL-MCNC: 247 MG/DL (ref 200–360)
WBC # BLD AUTO: 8.3 X10(3) UL (ref 4–11)

## 2024-06-25 PROCEDURE — 83540 ASSAY OF IRON: CPT

## 2024-06-25 PROCEDURE — 83550 IRON BINDING TEST: CPT

## 2024-06-25 PROCEDURE — 82728 ASSAY OF FERRITIN: CPT

## 2024-06-25 PROCEDURE — 85025 COMPLETE CBC W/AUTO DIFF WBC: CPT

## 2024-06-25 PROCEDURE — 36415 COLL VENOUS BLD VENIPUNCTURE: CPT

## 2024-07-23 ENCOUNTER — TELEPHONE (OUTPATIENT)
Dept: HEMATOLOGY/ONCOLOGY | Facility: HOSPITAL | Age: 44
End: 2024-07-23

## 2024-07-25 ENCOUNTER — TELEPHONE (OUTPATIENT)
Dept: HEMATOLOGY/ONCOLOGY | Facility: HOSPITAL | Age: 44
End: 2024-07-25

## 2024-08-08 ENCOUNTER — OFFICE VISIT (OUTPATIENT)
Dept: HEMATOLOGY/ONCOLOGY | Facility: HOSPITAL | Age: 44
End: 2024-08-08
Attending: INTERNAL MEDICINE
Payer: COMMERCIAL

## 2024-08-08 VITALS
SYSTOLIC BLOOD PRESSURE: 104 MMHG | TEMPERATURE: 97 F | HEART RATE: 76 BPM | RESPIRATION RATE: 16 BRPM | OXYGEN SATURATION: 95 % | DIASTOLIC BLOOD PRESSURE: 67 MMHG

## 2024-08-08 DIAGNOSIS — D50.0 IRON DEFICIENCY ANEMIA DUE TO CHRONIC BLOOD LOSS: Primary | ICD-10-CM

## 2024-08-08 PROCEDURE — 96374 THER/PROPH/DIAG INJ IV PUSH: CPT

## 2024-08-08 NOTE — PROGRESS NOTES
Pt here for feraheme . Pt denies any issues or concerns.      Ordering Provider: Dr Humphreys  Order Exp: 1 remaining dose     Pt tolerated infusion without difficulty or complaint. Reviewed next apt date/time: 8/15 @2:30pm      Education Record  Learner:  Patient  Disease / Diagnosis: iron deficiency anemia  Barriers / Limitations:  None  Method:  Brief focused and Reinforcement  General Topics:  Plan of care reviewed  Outcome:  Shows understanding     Pt discharged in stable condition.  Pt to rtc next week for 2nd dose of feraheme.

## 2024-08-15 ENCOUNTER — OFFICE VISIT (OUTPATIENT)
Dept: HEMATOLOGY/ONCOLOGY | Facility: HOSPITAL | Age: 44
End: 2024-08-15
Attending: INTERNAL MEDICINE
Payer: COMMERCIAL

## 2024-08-15 ENCOUNTER — HOSPITAL ENCOUNTER (OUTPATIENT)
Dept: ULTRASOUND IMAGING | Facility: HOSPITAL | Age: 44
Discharge: HOME | End: 2024-08-15
Attending: PHYSICIAN ASSISTANT
Payer: COMMERCIAL

## 2024-08-15 VITALS
RESPIRATION RATE: 16 BRPM | DIASTOLIC BLOOD PRESSURE: 66 MMHG | OXYGEN SATURATION: 98 % | SYSTOLIC BLOOD PRESSURE: 115 MMHG | HEART RATE: 62 BPM | TEMPERATURE: 98 F

## 2024-08-15 DIAGNOSIS — D50.0 IRON DEFICIENCY ANEMIA DUE TO CHRONIC BLOOD LOSS: Primary | ICD-10-CM

## 2024-08-15 DIAGNOSIS — N45.1 EPIDIDYMITIS: ICD-10-CM

## 2024-08-15 PROCEDURE — 93975 VASCULAR STUDY: CPT | Performed by: PHYSICIAN ASSISTANT

## 2024-08-15 PROCEDURE — 76870 US EXAM SCROTUM: CPT | Performed by: PHYSICIAN ASSISTANT

## 2024-08-15 PROCEDURE — 96374 THER/PROPH/DIAG INJ IV PUSH: CPT

## 2024-08-15 NOTE — PROGRESS NOTES
Education Record    Learner:  Patient and Spouse    Disease / Diagnosis: iron deficiency anemia    Barriers / Limitations:  None   Comments:    Method:  Discussion   Comments:    General Topics:  Medication, Side effects and symptom management, Plan of care reviewed, and Fall risk and prevention   Comments:    Outcome:  Shows understanding   Comments:    Feraheme infusion tolerated well. Pt discharged ambulatory in stable condition upon completion of infusion.

## 2024-11-06 ENCOUNTER — NURSE ONLY (OUTPATIENT)
Dept: HEMATOLOGY/ONCOLOGY | Facility: HOSPITAL | Age: 44
End: 2024-11-06
Attending: INTERNAL MEDICINE
Payer: COMMERCIAL

## 2024-11-06 DIAGNOSIS — D50.0 IRON DEFICIENCY ANEMIA DUE TO CHRONIC BLOOD LOSS: ICD-10-CM

## 2024-11-06 LAB
BASOPHILS # BLD AUTO: 0.05 X10(3) UL (ref 0–0.2)
BASOPHILS NFR BLD AUTO: 0.7 %
DEPRECATED HBV CORE AB SER IA-ACNC: 143 NG/ML
EOSINOPHIL # BLD AUTO: 0.27 X10(3) UL (ref 0–0.7)
EOSINOPHIL NFR BLD AUTO: 3.7 %
ERYTHROCYTE [DISTWIDTH] IN BLOOD BY AUTOMATED COUNT: 13 %
HCT VFR BLD AUTO: 47.7 %
HGB BLD-MCNC: 16.6 G/DL
IMM GRANULOCYTES # BLD AUTO: 0.04 X10(3) UL (ref 0–1)
IMM GRANULOCYTES NFR BLD: 0.5 %
IRON SATN MFR SERPL: 19 %
IRON SERPL-MCNC: 57 UG/DL
LYMPHOCYTES # BLD AUTO: 2.28 X10(3) UL (ref 1–4)
LYMPHOCYTES NFR BLD AUTO: 31.2 %
MCH RBC QN AUTO: 30.9 PG (ref 26–34)
MCHC RBC AUTO-ENTMCNC: 34.8 G/DL (ref 31–37)
MCV RBC AUTO: 88.8 FL
MONOCYTES # BLD AUTO: 0.6 X10(3) UL (ref 0.1–1)
MONOCYTES NFR BLD AUTO: 8.2 %
NEUTROPHILS # BLD AUTO: 4.06 X10 (3) UL (ref 1.5–7.7)
NEUTROPHILS # BLD AUTO: 4.06 X10(3) UL (ref 1.5–7.7)
NEUTROPHILS NFR BLD AUTO: 55.7 %
PLATELET # BLD AUTO: 238 10(3)UL (ref 150–450)
RBC # BLD AUTO: 5.37 X10(6)UL
TOTAL IRON BINDING CAPACITY: 296 UG/DL (ref 250–425)
TRANSFERRIN SERPL-MCNC: 232 MG/DL (ref 215–365)
WBC # BLD AUTO: 7.3 X10(3) UL (ref 4–11)

## 2024-11-06 PROCEDURE — 83550 IRON BINDING TEST: CPT

## 2024-11-06 PROCEDURE — 83540 ASSAY OF IRON: CPT

## 2024-11-06 PROCEDURE — 85025 COMPLETE CBC W/AUTO DIFF WBC: CPT

## 2024-11-06 PROCEDURE — 36415 COLL VENOUS BLD VENIPUNCTURE: CPT

## 2024-11-06 PROCEDURE — 82728 ASSAY OF FERRITIN: CPT

## 2024-11-08 ENCOUNTER — TELEPHONE (OUTPATIENT)
Dept: HEMATOLOGY/ONCOLOGY | Facility: HOSPITAL | Age: 44
End: 2024-11-08

## 2024-12-27 ENCOUNTER — HOSPITAL ENCOUNTER (OUTPATIENT)
Dept: MRI IMAGING | Facility: HOSPITAL | Age: 44
Discharge: HOME | End: 2024-12-27
Attending: Other
Payer: COMMERCIAL

## 2024-12-27 DIAGNOSIS — Z86.73 OLD CEREBELLAR INFARCT WITHOUT LATE EFFECT: ICD-10-CM

## 2024-12-27 PROCEDURE — 70553 MRI BRAIN STEM W/O & W/DYE: CPT | Performed by: OTHER

## 2024-12-27 PROCEDURE — A9575 INJ GADOTERATE MEGLUMI 0.1ML: HCPCS | Performed by: OTHER

## 2024-12-27 RX ORDER — GADOTERATE MEGLUMINE 376.9 MG/ML
30 INJECTION INTRAVENOUS
Status: COMPLETED | OUTPATIENT
Start: 2024-12-27 | End: 2024-12-27

## 2024-12-27 RX ADMIN — GADOTERATE MEGLUMINE 27 ML: 376.9 INJECTION INTRAVENOUS at 18:16:00

## 2025-03-10 ENCOUNTER — TELEPHONE (OUTPATIENT)
Dept: NEUROLOGY | Facility: CLINIC | Age: 45
End: 2025-03-10

## 2025-03-10 ENCOUNTER — OFFICE VISIT (OUTPATIENT)
Dept: NEUROLOGY | Facility: CLINIC | Age: 45
End: 2025-03-10
Payer: COMMERCIAL

## 2025-03-10 VITALS
RESPIRATION RATE: 16 BRPM | SYSTOLIC BLOOD PRESSURE: 112 MMHG | OXYGEN SATURATION: 97 % | WEIGHT: 208 LBS | BODY MASS INDEX: 29.78 KG/M2 | DIASTOLIC BLOOD PRESSURE: 80 MMHG | HEART RATE: 73 BPM | HEIGHT: 70 IN

## 2025-03-10 DIAGNOSIS — D18.02 HEMANGIOMA OF INTRACRANIAL STRUCTURE (HCC): ICD-10-CM

## 2025-03-10 DIAGNOSIS — Z86.73 OLD CEREBELLAR INFARCT WITHOUT LATE EFFECT: Primary | ICD-10-CM

## 2025-03-10 DIAGNOSIS — M89.9 FRONTAL SKULL LESION: ICD-10-CM

## 2025-03-10 RX ORDER — ASPIRIN 81 MG/1
81 TABLET, CHEWABLE ORAL DAILY
Qty: 90 TABLET | Refills: 3 | Status: SHIPPED | OUTPATIENT
Start: 2025-03-10 | End: 2026-03-05

## 2025-03-10 NOTE — PROGRESS NOTES
[facility]  Progress Note    Luis Daniel Headley     1980 MRN [id number]   Location [facility] PCP Luís Pitts MD       HPI:    Patient ID: Luis Daniel Headley is a right-handed 45 year old male.    HPI:  Patient presents to clinic for follow up on an old cerebellar infarct seen on MRI.  PMH is listed below and is significant for iron deficiency anemia 2/2 bleeding hemorrhoids which have since been banded.  He is accompanied by his wife Neil who is assisting with the history. Luis Daniel was initially seen due to a finding of old cerebellar infarct as well as L frontal mass lesion on MRI done on 22. Repeat imaging was completed in 2022 and again in 2024 and both reported a stable L frontal mass.  He followed up with Heme/Onc who completed workup and found it to be a hemangioma.  He was seen by Dr. Casey who completed stroke workup, including ECHO (negative for shunt, EF 60-65%). She recommended Aspirin 81 mg for the previous cerebellar infarct, which he says he was on for a while but since has stopped taking it.  He feels well and denies any headaches, vision changes, balance issues, focal weakness, paresthesias, confusion, or swallowing issues. He has increased sensitivity to the area of his scalp over his left frontal area that goes down around his left eye, but this does not cause pain or any other issues. He believes this to be the focus of his past headaches but he no longer gets them. He admits to using alcohol and smoking cannabis occasionally.  He exercises daily with his pets (speed walking/jogging).  Follows a healthy diet rich in vegetables.  Has not seen a PCP in several years - was told he was prediabetic in the past but no recent lab work is available for review.       HISTORY:  Past Medical History:    Abdominal pain    First day of pain    Acute, but ill-defined, cerebrovascular disease    We really do not know  when    Anemia    Back pain    Accident    Black stools    Was with previous GI dr    Blood disorder    anemic- seeing Hematologist for first time 1/24/23    Blood in the stool    Was with previous GI dr Yip tattoo    Have several    Dizziness    Been weak dizy lose blood almost daily    Excessive bleeding    When going poop    Glaucoma    Surgery    Headache disorder    Seeing neuro     Hemorrhoids    First noticed issues    History of blood transfusion    Jaundice    Went to ER    Lab test positive for detection of COVID-19 virus    11/2021 NO HOSPITALIZATION- CHILLS,FEVER, loss taste ,smell, body aches    Migraines    Mini stroke    Nausea    PONV (postoperative nausea and vomiting)    Weight loss      Past Surgical History:   Procedure Laterality Date    Colonoscopy  01/31/2022    Last one    Colonoscopy N/A 01/25/2023    Procedure: COLONOSCOPY with Hemorrhoid BANDING X3, ESOPHAGOGASTRODUODENOSCOPY (EGD) WITH BIOPSIES;  Surgeon: Chris Hernandez DO;  Location:  ENDOSCOPY    Colonoscopy  06/2023    flex sig w hemorrhoid banding      No family history on file.   Social History     Socioeconomic History    Marital status:    Tobacco Use    Smoking status: Some Days     Types: Cigarettes    Smokeless tobacco: Never   Vaping Use    Vaping status: Never Used   Substance and Sexual Activity    Alcohol use: Yes     Alcohol/week: 3.0 standard drinks of alcohol     Types: 3 Cans of beer per week     Comment: occasionally    Drug use: Yes     Frequency: 35.0 times per week     Types: Cannabis     Comment: Daily use/ flower   Other Topics Concern    Caffeine Concern Yes     Comment: 1 cup coffee daily    Exercise Yes     Comment: at least 1x per week, active at work        Review of Systems  ROS negative except as discussed in HPI       No current outpatient medications on file.     Allergies:Allergies[1]  FOCUSED PHYSICAL EXAM:     Vital Signs: /80   Pulse 73   Resp 16   Ht 70\"   Wt 208 lb  (94.3 kg)   SpO2 97%   BMI 29.84 kg/m²      General:   Appearance: Well nourished, well developed, no apparent distress.  HEENT: Normocephalic and atraumatic. Normal sclera. Moist mucus membrane  Neck: Normal range of motion.  Cardiovascular: No acute distress    Pulmonary/Chest: Effort normal, no use of accessory muscles  Skin: No rashes or lesions visualized     Mental Status Exam:   Patient is awake, alert and oriented to person, place and time with normal memory, fund of knowledge, attention/concentration and language.    Cranial Nerves:   II: Visual fields normal to confrontation test  III: Pupils equal, round, reactive to light  III,IV,VI: Normal EOM. Normal saccades.   V: Facial sensation intact, symmetric. Increased sensitivity to light touch over the left frontal area that extends down around his left orbit   VII: Facial strength normal, symmetric  VIII: Hearing grossly intact  IX: Palate elevates symmetrically  XI: Shoulder shrug elevates symmetrically  XII: Tongue protrudes in midline    Motor Exam:   Tone: Normal    Strength: Deltoid Biceps Triceps Hand  Hip Flex Quads Hamstring Dorsiflex Plantar Flex   Right 5 5 5 5 5 5 5 5 5   Left 5 5 5 5 5 5 5 5 5     Reflexes: biceps brachioradialis patellar achilles   Right 2+ 2+ 2+ 2+   Left 2+ 2+ 2+ 2+     Sensory:  Intact to light touch and vibration in all extremities    Coordination:   Normal finger-nose-finger test  Normal finger chasing test    Gait:   Stands up and walks unassisted with normal casual gait    Able to tandem walk without difficulty  Romberg negative    TESTS/IMAGING:         MRI BRAIN (W+WO) (CPT=70553) Result Date: 12/27/2024  CONCLUSION:  1. No acute intracranial abnormality. 2. Stable enhancing lesion in the left frontal bone likely represents a hemangioma.       ASSESSMENT/PLAN:     Encounter Diagnoses   Name Primary?    Old cerebellar infarct without late effect Yes    Frontal skull lesion     Hemangioma of intracranial structure  (HCC)        Impression:   Old cerebellar infarct: No residual deficits. Continue Aspirin 81 mg daily.  Educated on stroke risk reduction and encouraged to follow up with PCP.  Discussed s/s of stroke and when to call 911.     Hemangioma: Likely benign finding per Heme/Onc.  Serial scans from 2022 and 2024 show it is stable. Continue to monitor.  If return of headaches or other neurologic symptoms may repeat imaging to re-evaluate.     Plan:  Continue Aspirin 81 mg daily for stroke prevention  Follow up with your PCP to minimize stroke risk factors  Return to clinic in 1 year with Dr. Casey or sooner as needed  Pt should go ER for any new or worsening symptoms and contact office    Counseled and educated on Stroke Prevention:  Hypertension - target blood pressure less than 130/80   Physical inactivity - target exercise at least 3 times per week for a total of 150 minutes  Obesity - target ideal body weight and BMI  Diabetes - target HgA1c less than 7%   Smoking - stop smoking   Alcohol - stop drinking alcohol  Hyperlipidemia - target total cholesterol less than 200, target LDL less than 70, target HDL greater than 45 , target triglycerides less than 150   Encourage Mediterranean diet with an emphasis on lean meats and vegetables    Please do not hesitate to call if you have any questions.     Meds This Visit:  Requested Prescriptions      No prescriptions requested or ordered in this encounter       Imaging & Referrals:  None      JAMEL Willis  [facility]  3/10/2025  4:17 PM      The 21st Century Cures Act makes medical notes like these available to patients in the interest of transparency. Please be advised this is a medical document. Medical documents are intended to carry relevant information, facts as evident, and the clinical opinion of the practitioner. The medical note is intended as peer to peer communication and may appear blunt or direct. It is written in medical language and  may contain abbreviations or verbiage that are unfamiliar.     This note was prepared using Dragon Medical voice recognition dictation software. As a result errors may occur. When identified these errors have been corrected. While every attempt is made to correct errors during dictation discrepancies may still exist.          [1] No Known Allergies

## 2025-03-10 NOTE — PROGRESS NOTES
Patient is here following up on the stroke   Patient here with wife today  Patient has no questions or concerns

## 2025-03-10 NOTE — PATIENT INSTRUCTIONS
Plan:  Continue Aspirin 81 mg daily for stroke prevention  Follow up with your PCP to minimize stroke risk factors  Return to clinic in 1 year with Dr. Casey or sooner as needed  Pt should go ER for any new or worsening symptoms and contact office    Counseled and educated again on secondary Stroke Prevention:  Hypertension - target blood pressure less than 130/80   Physical inactivity - target exercise at least 3 times per week for a total of 150 minutes  Obesity - target ideal body weight and BMI  Diabetes - target HgA1c less than 7%   Smoking - stop smoking   Hyperlipidemia - target total cholesterol less than 200, target LDL less than 70, target HDL greater than 45 , target triglycerides less than 150   Encourage Mediterranean diet with an emphasis on lean meats and vegetables    Refill policies:    Allow 2-3 business days for refills; controlled substances may take longer.  Contact your pharmacy at least 5 days prior to running out of medication and have them send an electronic request or submit request through the “request refill” option in your [employer] account.  Refills are not addressed on weekends; covering physicians do not authorize routine medications on weekends.  No narcotics or controlled substances are refilled after noon on Fridays or by on call physicians.  By law, narcotics must be electronically prescribed.  A 30 day supply with no refills is the maximum allowed.  If your prescription is due for a refill, you may be due for a follow up appointment.  To best provide you care, patients receiving routine medications need to be seen at least once a year.  Patients receiving narcotic/controlled substance medications need to be seen at least once every 3 months.  In the event that your preferred pharmacy does not have the requested medication in stock (e.g. Backordered), it is your responsibility to find another pharmacy that has the requested medication available.  We will gladly send a new  prescription to that pharmacy at your request.    Scheduling Tests:    If your physician has ordered radiology tests such as MRI or CT scans, please contact Central Scheduling at [phone] right away to schedule the test.  Once scheduled, the [facility] Centralized Referral Team will work with your insurance carrier to obtain pre-certification or prior authorization.  Depending on your insurance carrier, approval may take 3-10 days.  It is highly recommended patients assure they have received an authorization before having a test performed.  If test is done without insurance authorization, patient may be responsible for the entire amount billed.      Precertification and Prior Authorizations:  If your physician has recommended that you have a procedure or additional testing performed the [facility] Centralized Referral Team will contact your insurance carrier to obtain pre-certification or prior authorization.    You are strongly encouraged to contact your insurance carrier to verify that your procedure/test has been approved and is a COVERED benefit.  Although the [facility] Centralized Referral Team does its due diligence, the insurance carrier gives the disclaimer that \"Although the procedure is authorized, this does not guarantee payment.\"    Ultimately the patient is responsible for payment.   Thank you for your understanding in this matter.  Paperwork Completion:  If you require FMLA or disability paperwork for your recovery, please make sure to either drop it off or have it faxed to our office at [phone]. Be sure the form has your name and date of birth on it.  The form will be faxed to our Forms Department and they will complete it for you.  There is a 25$ fee for all forms that need to be filled out.  Please be aware there is a 10-14 day turnaround time.  You will need to sign a release of information (TOM) form if your paperwork does not come with one.  You may call the Forms Department with any questions at  [phone].  Their fax number is [phone].

## 2025-03-25 ENCOUNTER — TELEPHONE (OUTPATIENT)
Age: 45
End: 2025-03-25

## 2025-04-09 ENCOUNTER — OFFICE VISIT (OUTPATIENT)
Dept: INTERNAL MEDICINE CLINIC | Facility: CLINIC | Age: 45
End: 2025-04-09
Payer: COMMERCIAL

## 2025-04-09 VITALS
TEMPERATURE: 99 F | HEIGHT: 69.29 IN | DIASTOLIC BLOOD PRESSURE: 80 MMHG | HEART RATE: 77 BPM | WEIGHT: 208.5 LBS | SYSTOLIC BLOOD PRESSURE: 120 MMHG | RESPIRATION RATE: 16 BRPM | BODY MASS INDEX: 30.53 KG/M2 | OXYGEN SATURATION: 96 %

## 2025-04-09 DIAGNOSIS — H61.21 IMPACTED CERUMEN OF RIGHT EAR: ICD-10-CM

## 2025-04-09 DIAGNOSIS — N50.3 EPIDIDYMAL CYST: ICD-10-CM

## 2025-04-09 DIAGNOSIS — Z13.220 SCREENING FOR CHOLESTEROL LEVEL: ICD-10-CM

## 2025-04-09 DIAGNOSIS — D64.9 ANEMIA, UNSPECIFIED TYPE: ICD-10-CM

## 2025-04-09 DIAGNOSIS — M54.42 CHRONIC BILATERAL LOW BACK PAIN WITH SCIATICA, SCIATICA LATERALITY UNSPECIFIED: ICD-10-CM

## 2025-04-09 DIAGNOSIS — N43.3 HYDROCELE IN ADULT: ICD-10-CM

## 2025-04-09 DIAGNOSIS — Z13.29 SCREENING FOR THYROID DISORDER: ICD-10-CM

## 2025-04-09 DIAGNOSIS — G93.9 BRAIN LESION: ICD-10-CM

## 2025-04-09 DIAGNOSIS — G89.29 CHRONIC BILATERAL LOW BACK PAIN WITH SCIATICA, SCIATICA LATERALITY UNSPECIFIED: ICD-10-CM

## 2025-04-09 DIAGNOSIS — M54.41 CHRONIC BILATERAL LOW BACK PAIN WITH SCIATICA, SCIATICA LATERALITY UNSPECIFIED: ICD-10-CM

## 2025-04-09 DIAGNOSIS — K59.00 CONSTIPATION, UNSPECIFIED CONSTIPATION TYPE: ICD-10-CM

## 2025-04-09 DIAGNOSIS — Z00.00 ENCOUNTER FOR ANNUAL PHYSICAL EXAM: Primary | ICD-10-CM

## 2025-04-09 DIAGNOSIS — E53.8 B12 DEFICIENCY: ICD-10-CM

## 2025-04-09 DIAGNOSIS — Z86.73 OLD CEREBELLAR INFARCT WITHOUT LATE EFFECT: ICD-10-CM

## 2025-04-09 DIAGNOSIS — E55.9 VITAMIN D DEFICIENCY: ICD-10-CM

## 2025-04-09 DIAGNOSIS — R73.03 PREDIABETES: ICD-10-CM

## 2025-04-10 PROBLEM — N50.3 EPIDIDYMAL CYST: Status: ACTIVE | Noted: 2025-04-10

## 2025-04-10 PROBLEM — G89.29 CHRONIC BILATERAL LOW BACK PAIN WITH SCIATICA: Status: ACTIVE | Noted: 2025-04-10

## 2025-04-10 PROBLEM — M54.42 CHRONIC BILATERAL LOW BACK PAIN WITH SCIATICA: Status: ACTIVE | Noted: 2025-04-10

## 2025-04-10 PROBLEM — N43.3 HYDROCELE IN ADULT: Status: ACTIVE | Noted: 2025-04-10

## 2025-04-10 PROBLEM — M54.41 CHRONIC BILATERAL LOW BACK PAIN WITH SCIATICA: Status: ACTIVE | Noted: 2025-04-10

## 2025-04-26 ENCOUNTER — LAB ENCOUNTER (OUTPATIENT)
Dept: LAB | Facility: HOSPITAL | Age: 45
End: 2025-04-26
Attending: NURSE PRACTITIONER
Payer: COMMERCIAL

## 2025-04-26 DIAGNOSIS — D64.9 ANEMIA, UNSPECIFIED TYPE: ICD-10-CM

## 2025-04-26 DIAGNOSIS — E55.9 VITAMIN D DEFICIENCY: ICD-10-CM

## 2025-04-26 DIAGNOSIS — Z13.29 SCREENING FOR THYROID DISORDER: ICD-10-CM

## 2025-04-26 DIAGNOSIS — Z00.00 ENCOUNTER FOR ANNUAL PHYSICAL EXAM: ICD-10-CM

## 2025-04-26 DIAGNOSIS — E53.8 B12 DEFICIENCY: ICD-10-CM

## 2025-04-26 DIAGNOSIS — R73.03 PREDIABETES: ICD-10-CM

## 2025-04-26 DIAGNOSIS — Z13.220 SCREENING FOR CHOLESTEROL LEVEL: ICD-10-CM

## 2025-04-26 LAB
ALBUMIN SERPL-MCNC: 4.7 G/DL (ref 3.2–4.8)
ALBUMIN/GLOB SERPL: 2 {RATIO} (ref 1–2)
ALP LIVER SERPL-CCNC: 65 U/L (ref 45–117)
ALT SERPL-CCNC: 17 U/L (ref 10–49)
ANION GAP SERPL CALC-SCNC: 7 MMOL/L (ref 0–18)
AST SERPL-CCNC: 18 U/L (ref ?–34)
BASOPHILS # BLD AUTO: 0.06 X10(3) UL (ref 0–0.2)
BASOPHILS NFR BLD AUTO: 0.9 %
BILIRUB SERPL-MCNC: 0.8 MG/DL (ref 0.3–1.2)
BUN BLD-MCNC: 14 MG/DL (ref 9–23)
CALCIUM BLD-MCNC: 9.6 MG/DL (ref 8.7–10.6)
CHLORIDE SERPL-SCNC: 108 MMOL/L (ref 98–112)
CHOLEST SERPL-MCNC: 154 MG/DL (ref ?–200)
CO2 SERPL-SCNC: 29 MMOL/L (ref 21–32)
CREAT BLD-MCNC: 1.07 MG/DL (ref 0.7–1.3)
DEPRECATED HBV CORE AB SER IA-ACNC: 195 NG/ML (ref 50–336)
EGFRCR SERPLBLD CKD-EPI 2021: 87 ML/MIN/1.73M2 (ref 60–?)
EOSINOPHIL # BLD AUTO: 0.25 X10(3) UL (ref 0–0.7)
EOSINOPHIL NFR BLD AUTO: 3.9 %
ERYTHROCYTE [DISTWIDTH] IN BLOOD BY AUTOMATED COUNT: 12.6 %
EST. AVERAGE GLUCOSE BLD GHB EST-MCNC: 108 MG/DL (ref 68–126)
FASTING PATIENT LIPID ANSWER: YES
FASTING STATUS PATIENT QL REPORTED: YES
GLOBULIN PLAS-MCNC: 2.4 G/DL (ref 2–3.5)
GLUCOSE BLD-MCNC: 87 MG/DL (ref 70–99)
HBA1C MFR BLD: 5.4 % (ref ?–5.7)
HCT VFR BLD AUTO: 49.7 % (ref 39–53)
HDLC SERPL-MCNC: 43 MG/DL (ref 40–59)
HGB BLD-MCNC: 16.9 G/DL (ref 13–17.5)
IMM GRANULOCYTES # BLD AUTO: 0.02 X10(3) UL (ref 0–1)
IMM GRANULOCYTES NFR BLD: 0.3 %
IRON SATN MFR SERPL: 36 % (ref 20–50)
IRON SERPL-MCNC: 100 UG/DL (ref 65–175)
LDLC SERPL CALC-MCNC: 98 MG/DL (ref ?–100)
LYMPHOCYTES # BLD AUTO: 2.25 X10(3) UL (ref 1–4)
LYMPHOCYTES NFR BLD AUTO: 35 %
MCH RBC QN AUTO: 29.9 PG (ref 26–34)
MCHC RBC AUTO-ENTMCNC: 34 G/DL (ref 31–37)
MCV RBC AUTO: 87.8 FL (ref 80–100)
MONOCYTES # BLD AUTO: 0.51 X10(3) UL (ref 0.1–1)
MONOCYTES NFR BLD AUTO: 7.9 %
NEUTROPHILS # BLD AUTO: 3.33 X10 (3) UL (ref 1.5–7.7)
NEUTROPHILS # BLD AUTO: 3.33 X10(3) UL (ref 1.5–7.7)
NEUTROPHILS NFR BLD AUTO: 52 %
NONHDLC SERPL-MCNC: 111 MG/DL (ref ?–130)
OSMOLALITY SERPL CALC.SUM OF ELEC: 298 MOSM/KG (ref 275–295)
PLATELET # BLD AUTO: 242 10(3)UL (ref 150–450)
POTASSIUM SERPL-SCNC: 4.4 MMOL/L (ref 3.5–5.1)
PROT SERPL-MCNC: 7.1 G/DL (ref 5.7–8.2)
RBC # BLD AUTO: 5.66 X10(6)UL (ref 4.3–5.7)
SODIUM SERPL-SCNC: 144 MMOL/L (ref 136–145)
TOTAL IRON BINDING CAPACITY: 276 UG/DL (ref 250–425)
TRANSFERRIN SERPL-MCNC: 214 MG/DL (ref 215–365)
TRIGL SERPL-MCNC: 65 MG/DL (ref 30–149)
TSI SER-ACNC: 0.75 UIU/ML (ref 0.55–4.78)
VIT B12 SERPL-MCNC: 253 PG/ML (ref 211–911)
VIT D+METAB SERPL-MCNC: 16.9 NG/ML (ref 30–100)
VLDLC SERPL CALC-MCNC: 11 MG/DL (ref 0–30)
WBC # BLD AUTO: 6.4 X10(3) UL (ref 4–11)

## 2025-04-26 PROCEDURE — 85025 COMPLETE CBC W/AUTO DIFF WBC: CPT

## 2025-04-26 PROCEDURE — 36415 COLL VENOUS BLD VENIPUNCTURE: CPT

## 2025-04-26 PROCEDURE — 82306 VITAMIN D 25 HYDROXY: CPT

## 2025-04-26 PROCEDURE — 83036 HEMOGLOBIN GLYCOSYLATED A1C: CPT

## 2025-04-26 PROCEDURE — 80053 COMPREHEN METABOLIC PANEL: CPT

## 2025-04-26 PROCEDURE — 83540 ASSAY OF IRON: CPT

## 2025-04-26 PROCEDURE — 83550 IRON BINDING TEST: CPT

## 2025-04-26 PROCEDURE — 82607 VITAMIN B-12: CPT

## 2025-04-26 PROCEDURE — 80061 LIPID PANEL: CPT

## 2025-04-26 PROCEDURE — 84443 ASSAY THYROID STIM HORMONE: CPT

## 2025-04-26 PROCEDURE — 82728 ASSAY OF FERRITIN: CPT

## (undated) DEVICE — 1200CC GUARDIAN II: Brand: GUARDIAN

## (undated) DEVICE — UNDERPANTS INCONT 2XL KNIT MAT

## (undated) DEVICE — GLOVE SUR 7 SENSICARE PI PIP CRM PWD F

## (undated) DEVICE — 3M™ RED DOT™ MONITORING ELECTRODE WITH FOAM TAPE AND STICKY GEL 2570-3, 3/BAG, 200/CASE, 54/PLT: Brand: RED DOT™

## (undated) DEVICE — ENDOSCOPY PACK - LOWER: Brand: MEDLINE INDUSTRIES, INC.

## (undated) DEVICE — SUTURE CHRM GUT 3-0 27IN ABSRB UD 26MM SH 1/2

## (undated) DEVICE — GLOVE SUR 7.5 SENSICARE PI PIP GRN PWD F

## (undated) DEVICE — SPEEDBAND SUPERVIEW SUPER 7

## (undated) DEVICE — 3M™ RED DOT™ MONITORING ELECTRODE WITH FOAM TAPE AND STICKY GEL, 50/BAG, 20/CASE, 72/PLT 2570: Brand: RED DOT™

## (undated) DEVICE — Device: Brand: DEFENDO AIR/WATER/SUCTION AND BIOPSY VALVE

## (undated) DEVICE — PACK CDS RECTAL

## (undated) DEVICE — SPONGE GZ W4XL4IN 100% COT 12 PLY TYP VII WVN

## (undated) DEVICE — Device

## (undated) DEVICE — FILTERLINE NASAL ADULT O2/CO2

## (undated) DEVICE — SUTURE COAT VCRL 2-0 27IN ABSRB VLT 26MM SH

## (undated) DEVICE — 10FT COMBINED O2 DELIVERY/CO2 MONITORING. FILTER WITH MICROSTREAM TYPE LUER: Brand: DUAL ADULT NASAL CANNULA

## (undated) DEVICE — SOLUTION IRRIG 1000ML 0.9% NACL USP BTL

## (undated) DEVICE — SLEEVE COMPR M KNEE LEN SGL USE KENDALL SCD

## (undated) DEVICE — KIT ENDO ORCAPOD 160/180/190

## (undated) NOTE — LETTER
Date & Time: 9/16/2018, 7:07 PM  Patient: Filiberto Alejandro  Encounter Provider(s):    Breana Morrison MD       To Whom It May Concern:    Filiberto Alejandro was seen and treated in our department on 9/16/2018. He should not return to work until 9/20.

## (undated) NOTE — LETTER
3/4/2024    Return to Work    Name: Luis Daniel Headley        : 1980    To Whom It May Concern,    Luis Daniel Headley had surgery on 2024 with Dr. Gomez and is:    Able to return to work without restrictions on 2024.    The patient may return to work on 2024.    If there are any further questions, regarding this patient's care, please contact the patient directly.    Sincerely,    Juliette Price PA-C

## (undated) NOTE — ED AVS SNAPSHOT
Mr. Taylor Juan   MRN: [id number]    Department:  [facility] Emergency Department   Date of Visit:  9/16/2018           Disclosure     Insurance plans vary and the physician(s) referred by the ER may not be covered by your plan.  Please conta tell this physician (or your personal doctor if your instructions are to return to your personal doctor) about any new or lasting problems. The primary care or specialist physician will see patients referred from the [facility] Emergency Department.  Jac Townsend